# Patient Record
Sex: MALE | Race: WHITE | NOT HISPANIC OR LATINO | Employment: FULL TIME
[De-identification: names, ages, dates, MRNs, and addresses within clinical notes are randomized per-mention and may not be internally consistent; named-entity substitution may affect disease eponyms.]

---

## 2021-04-20 ENCOUNTER — TRANSCRIBE ORDERS (OUTPATIENT)
Dept: OTHER | Age: 66
End: 2021-04-20

## 2021-04-20 DIAGNOSIS — R06.09 DYSPNEA ON EXERTION: Primary | ICD-10-CM

## 2021-04-23 ENCOUNTER — DOCUMENTATION ONLY (OUTPATIENT)
Dept: ONCOLOGY | Facility: CLINIC | Age: 66
End: 2021-04-23

## 2021-04-23 ENCOUNTER — TELEPHONE (OUTPATIENT)
Dept: ONCOLOGY | Facility: CLINIC | Age: 66
End: 2021-04-23

## 2021-04-23 NOTE — PROGRESS NOTES
RECORDS STATUS - ALL OTHER DIAGNOSIS      RECORDS RECEIVED FROM: Sylvester Mayo   DATE RECEIVED:    NOTES STATUS DETAILS   OFFICE NOTE from referring provider     OFFICE NOTE from medical oncologist     DISCHARGE SUMMARY from hospital     DISCHARGE REPORT from the ER     OPERATIVE REPORT     MEDICATION LIST     CLINICAL TRIAL TREATMENTS TO DATE     LABS     PATHOLOGY REPORTS     ANYTHING RELATED TO DIAGNOSIS     GENONOMIC TESTING     TYPE:     IMAGING (NEED IMAGES & REPORT)     XR Requested 4/23 [facility]:  10/11/20, 10/10/20    [facility]:  8/23/19   CT SCANS Requested 4/23 [facility]:  10/10/20    [facility]:  12/23/19, 10/17/19, 8/20/19, 8/19/19   MRI     MAMMO     ULTRASOUND     PET

## 2021-04-27 NOTE — PROGRESS NOTES
"THORACIC SURGERY - NEW PATIENT OFFICE VISIT      I saw Mr. Doe at Dr. Xiao  request in consultation for the evaluation and treatment of a elevated R ry diaphragm.     HPI  Mr. Bret Doe is a 66 yo male with R hemidiaphragm paralysis. He has had SOB for and progressively worsening JARAMILLO (able to walk approximately 50 yards before needing to stop) and rest. His symptoms began following his R nephrectomy for RCC (stage IV d/t LN invasion, patient reports disease as \"stable\"). He has recently been prescribed supplemental home oxygen following a walk test during which his saturations dropped to low-mid 80s with <100 yard walking distance. He feels like his life is severely limited and is motivated to have this taken care of. He has been followed by Dr. Aldana's for his declining respiratory function. He is currently completing a 1-year+ course of daily prednisone, currently on taper at 5 mg/day for his remaining left kidney. He is followed at [facility] for renal function monitoring.    Previsit Tests   CXR:      Fluoro:  + paradoxical movement      CT: coronal       PMH  Degenerative lumbar disc disease L5-S1  RCC (clear cell carcinoma) s/p R nephrectomy 09/2019 and chemo (CT: scattered granulomatous disease in lungs but no malignancy in lungs, MRI brain without metastasis)  HLD  HTN    ETOH: occasional, describes inability to consume alcohol d/t nephrectomy but admits to the occasional bloody isabel  TOB: some, as juvenile    Physical examination  BMI 29.37    From a personal perspective, Mr. 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 He has a son that lives near him and works as a paramedic.     IMPRESSION No diagnosis found.   Mr. Doe is a 66 yo M with a paralyzed right hemidiaphragm   moderate-severe right-hemidiaphragm eventration/paralysis and may indeed benefit from plication surgery.     PLAN  I spent a total " of 30 minutes with Mr. Doe and his sister, more than 50% of which were spent in counseling, coordination of care, and face-to-face time. I reviewed the plan as follows:    We discussed that a plication surgery could be considered based on his symptoms.    1) Procedure planned:Robotic laparoscopic/thoracoscopic diaphragm plication.  I reviewed the indications, risks, and benefits of the procedure with Mr. Doe. We discussed the intraoperative risks of bleeding and injury to vital organs, potential postoperative complications including, but not limited to, major respiratory events, arrhythmia, bleeding, infection, reoperation, and death. I explained the anticipated hospital course (2-5 days) and postoperative recovery including pain control, chest drain management, and anticipated improvement in dyspnea. We discussed the importance of lifetime awareness to limit lifting heavy weights.  We discussed that given his abdominal surgery, we might prefer a thoracoscopic approach for him.        Necessary Preop Testing:   Sitting/supine PFTs  PAC visit for preoperative care assessment including VTE risk, cardiovascular risk.     Regional Anesthesia Plan:local at port sites  Anticoagulation Plan:s/c heparin post op  They had all their questions answered and were in agreement with the plan.  I appreciate the opportunity to participate in the care of your patient and will keep you updated.  Sincerely,

## 2021-04-29 ENCOUNTER — OFFICE VISIT (OUTPATIENT)
Dept: SURGERY | Facility: CLINIC | Age: 66
End: 2021-04-29
Attending: STUDENT IN AN ORGANIZED HEALTH CARE EDUCATION/TRAINING PROGRAM
Payer: COMMERCIAL

## 2021-04-29 VITALS
BODY MASS INDEX: 29.03 KG/M2 | HEART RATE: 67 BPM | WEIGHT: 196 LBS | TEMPERATURE: 98 F | OXYGEN SATURATION: 97 % | DIASTOLIC BLOOD PRESSURE: 71 MMHG | SYSTOLIC BLOOD PRESSURE: 106 MMHG | HEIGHT: 69 IN | RESPIRATION RATE: 16 BRPM

## 2021-04-29 DIAGNOSIS — J98.6 PARALYZED HEMIDIAPHRAGM: Primary | ICD-10-CM

## 2021-04-29 DIAGNOSIS — R06.09 DYSPNEA ON EXERTION: ICD-10-CM

## 2021-04-29 PROCEDURE — G0463 HOSPITAL OUTPT CLINIC VISIT: HCPCS

## 2021-04-29 PROCEDURE — 99204 OFFICE O/P NEW MOD 45 MIN: CPT | Performed by: STUDENT IN AN ORGANIZED HEALTH CARE EDUCATION/TRAINING PROGRAM

## 2021-04-29 RX ORDER — ERGOCALCIFEROL 1.25 MG/1
50000 CAPSULE ORAL
COMMUNITY
Start: 2020-07-28

## 2021-04-29 RX ORDER — LISINOPRIL 2.5 MG/1
1.75 TABLET ORAL EVERY EVENING
Status: ON HOLD | COMMUNITY
Start: 2020-07-28 | End: 2021-06-11

## 2021-04-29 RX ORDER — CALCITRIOL 0.25 UG/1
0.25 CAPSULE, LIQUID FILLED ORAL
Status: ON HOLD | COMMUNITY
Start: 2020-09-25 | End: 2021-06-11

## 2021-04-29 RX ORDER — ATORVASTATIN CALCIUM 40 MG/1
40 TABLET, FILM COATED ORAL EVERY EVENING
COMMUNITY
Start: 2021-02-25 | End: 2022-02-25

## 2021-04-29 RX ORDER — PREDNISONE 10 MG/1
15 TABLET ORAL
Status: ON HOLD | COMMUNITY
Start: 2021-02-09 | End: 2021-06-11

## 2021-04-29 RX ORDER — SODIUM ZIRCONIUM CYCLOSILICATE 10 G/10G
POWDER, FOR SUSPENSION ORAL
Status: ON HOLD | COMMUNITY
Start: 2021-04-12 | End: 2021-06-11

## 2021-04-29 SDOH — HEALTH STABILITY: MENTAL HEALTH: HOW OFTEN DO YOU HAVE A DRINK CONTAINING ALCOHOL?: NOT ASKED

## 2021-04-29 SDOH — HEALTH STABILITY: MENTAL HEALTH: HOW MANY STANDARD DRINKS CONTAINING ALCOHOL DO YOU HAVE ON A TYPICAL DAY?: NOT ASKED

## 2021-04-29 SDOH — HEALTH STABILITY: MENTAL HEALTH: HOW OFTEN DO YOU HAVE 6 OR MORE DRINKS ON ONE OCCASION?: NOT ASKED

## 2021-04-29 ASSESSMENT — MIFFLIN-ST. JEOR: SCORE: 1666.49

## 2021-04-29 ASSESSMENT — PAIN SCALES - GENERAL: PAINLEVEL: NO PAIN (0)

## 2021-04-29 NOTE — LETTER
"    4/29/2021         RE: Bret Doe  [address]  [facility] [address]        Dear Colleague,    Thank you for referring your patient, Bret Doe, to the [facility]. Please see a copy of my visit note below.    THORACIC SURGERY - NEW PATIENT OFFICE VISIT      I saw Mr. Doe at Dr. Xiao  request in consultation for the evaluation and treatment of a elevated R ry diaphragm.     HPI  Mr. Bret Doe is a 66 yo male with R hemidiaphragm paralysis. He has had SOB for and progressively worsening JARAMILLO (able to walk approximately 50 yards before needing to stop) and rest. His symptoms began following his R nephrectomy for RCC (stage IV d/t LN invasion, patient reports disease as \"stable\"). He has recently been prescribed supplemental home oxygen following a walk test during which his saturations dropped to low-mid 80s with <100 yard walking distance. He feels like his life is severely limited and is motivated to have this taken care of. He has been followed by Dr. Aldana's for his declining respiratory function. He is currently completing a 1-year+ course of daily prednisone, currently on taper at 5 mg/day for his remaining left kidney. He is followed at [facility] for renal function monitoring.    Previsit Tests   CXR:      Fluoro:  + paradoxical movement      CT: coronal       PMH  Degenerative lumbar disc disease L5-S1  RCC (clear cell carcinoma) s/p R nephrectomy 09/2019 and chemo (CT: scattered granulomatous disease in lungs but no malignancy in lungs, MRI brain without metastasis)  HLD  HTN    ETOH: occasional, describes inability to consume alcohol d/t nephrectomy but admits to the occasional bloody isabel  TOB: some, as juvenile    Physical examination  BMI 29.37    From a personal perspective, Mr. 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 He has a son that lives near " him and works as a paramedic.     IMPRESSION No diagnosis found.   Mr. Doe is a 66 yo M with a paralyzed right hemidiaphragm   moderate-severe right-hemidiaphragm eventration/paralysis and may indeed benefit from plication surgery.     PLAN  I spent a total of 30 minutes with Mr. Doe and his sister, more than 50% of which were spent in counseling, coordination of care, and face-to-face time. I reviewed the plan as follows:    We discussed that a plication surgery could be considered based on his symptoms.    1) Procedure planned:Robotic laparoscopic/thoracoscopic diaphragm plication.  I reviewed the indications, risks, and benefits of the procedure with Mr. Doe. We discussed the intraoperative risks of bleeding and injury to vital organs, potential postoperative complications including, but not limited to, major respiratory events, arrhythmia, bleeding, infection, reoperation, and death. I explained the anticipated hospital course (2-5 days) and postoperative recovery including pain control, chest drain management, and anticipated improvement in dyspnea. We discussed the importance of lifetime awareness to limit lifting heavy weights.  We discussed that given his abdominal surgery, we might prefer a thoracoscopic approach for him.        Necessary Preop Testing:   Sitting/supine PFTs  PAC visit for preoperative care assessment including VTE risk, cardiovascular risk.     Regional Anesthesia Plan:local at port sites  Anticoagulation Plan:s/c heparin post op  They had all their questions answered and were in agreement with the plan.  I appreciate the opportunity to participate in the care of your patient and will keep you updated.  Sincerely,        Again, thank you for allowing me to participate in the care of your patient.        Sincerely,        Chyna Deleon MD

## 2021-04-29 NOTE — NURSING NOTE
"Oncology Rooming Note    April 29, 2021 11:00 AM   Bret Doe is a 65 year old male who presents for:    Chief Complaint   Patient presents with     Oncology Clinic Visit     New pt- Dyspnea on exertion     Initial Vitals: /71 (BP Location: Right arm, Patient Position: Sitting, Cuff Size: Adult Regular)   Pulse 67   Temp 98  F (36.7  C) (Tympanic)   Resp 16   Ht 1.74 m (5' 8.5\")   Wt 88.9 kg (196 lb)   SpO2 97%   BMI 29.37 kg/m   Estimated body mass index is 29.37 kg/m  as calculated from the following:    Height as of this encounter: 1.74 m (5' 8.5\").    Weight as of this encounter: 88.9 kg (196 lb). Body surface area is 2.07 meters squared.  No Pain (0) Comment: Data Unavailable   No LMP for male patient.  Allergies reviewed: Yes  Medications reviewed: Yes    Medications: Medication refills not needed today.  Pharmacy name entered into EPIC: Data Unavailable    Clinical concerns: N/A        Paz Haro CMA              "

## 2021-04-30 ENCOUNTER — PREP FOR PROCEDURE (OUTPATIENT)
Dept: SURGERY | Facility: CLINIC | Age: 66
End: 2021-04-30

## 2021-04-30 DIAGNOSIS — J98.6 HEMIDIAPHRAGM PARALYSIS: Primary | ICD-10-CM

## 2021-04-30 RX ORDER — CEFAZOLIN SODIUM 2 G/50ML
2 SOLUTION INTRAVENOUS SEE ADMIN INSTRUCTIONS
Status: CANCELLED | OUTPATIENT
Start: 2021-04-30

## 2021-04-30 RX ORDER — CEFAZOLIN SODIUM 2 G/50ML
2 SOLUTION INTRAVENOUS
Status: CANCELLED | OUTPATIENT
Start: 2021-04-30

## 2021-05-05 ENCOUNTER — PREP FOR PROCEDURE (OUTPATIENT)
Dept: SURGERY | Facility: CLINIC | Age: 66
End: 2021-05-05

## 2021-05-05 DIAGNOSIS — J98.6 PARALYSIS OF DIAPHRAGM: Primary | ICD-10-CM

## 2021-05-05 RX ORDER — CEFAZOLIN SODIUM 2 G/50ML
2 SOLUTION INTRAVENOUS SEE ADMIN INSTRUCTIONS
Status: CANCELLED | OUTPATIENT
Start: 2021-05-05

## 2021-05-05 RX ORDER — CEFAZOLIN SODIUM 2 G/50ML
2 SOLUTION INTRAVENOUS
Status: CANCELLED | OUTPATIENT
Start: 2021-05-05

## 2021-05-07 PROBLEM — J98.6 PARALYSIS OF DIAPHRAGM: Status: ACTIVE | Noted: 2021-05-07

## 2021-05-10 ENCOUNTER — TELEPHONE (OUTPATIENT)
Dept: SURGERY | Facility: CLINIC | Age: 66
End: 2021-05-10

## 2021-05-10 NOTE — TELEPHONE ENCOUNTER
FUTURE VISIT INFORMATION      SURGERY INFORMATION:    Date: 21    Location: [facility] OR    Surgeon:  Chyna Deleon MD PLICATION, DIAPHRAGM, ROBOT-ASSISTED - thoracoscopic/ laparoscopic    Anesthesia Type:  general    Procedure: PLICATION, DIAPHRAGM, ROBOT-ASSISTED - thoracoscopic/ laparoscopic    Consult: ov     RECORDS REQUESTED FROM:       Primary Care Provider: Heidi Reeves- [facility]    Most recent EKG+ Tracin19- [facility]    Most recent ECHO: 10/12/20- 10/12/20- [facility]    Most recent PFT's: 21

## 2021-05-10 NOTE — TELEPHONE ENCOUNTER
Spoke with patient to schedule procedure with Dr. Chyna Deleon    Procedure was scheduled on 06/11 at [facility] OR  Patient will have H&P with PAC 06/02    Patient is aware a COVID-19 test is needed before their procedure. The test should be with-in 4 days of their procedure.   Test Details: Date 06/07  Location [facility]    Patient is aware a / is needed day of surgery.   Surgery letter was sent via Mail, patient has my direct contact information for any further questions.

## 2021-05-19 DIAGNOSIS — Z11.59 ENCOUNTER FOR SCREENING FOR OTHER VIRAL DISEASES: ICD-10-CM

## 2021-06-02 ENCOUNTER — PRE VISIT (OUTPATIENT)
Dept: SURGERY | Facility: CLINIC | Age: 66
End: 2021-06-02

## 2021-06-02 ENCOUNTER — ANESTHESIA EVENT (OUTPATIENT)
Dept: SURGERY | Facility: CLINIC | Age: 66
DRG: 164 | End: 2021-06-02
Payer: COMMERCIAL

## 2021-06-02 ENCOUNTER — OFFICE VISIT (OUTPATIENT)
Dept: SURGERY | Facility: CLINIC | Age: 66
End: 2021-06-02
Payer: COMMERCIAL

## 2021-06-02 VITALS
WEIGHT: 178.4 LBS | TEMPERATURE: 97.8 F | OXYGEN SATURATION: 99 % | RESPIRATION RATE: 16 BRPM | HEIGHT: 68 IN | HEART RATE: 78 BPM | DIASTOLIC BLOOD PRESSURE: 59 MMHG | BODY MASS INDEX: 27.04 KG/M2 | SYSTOLIC BLOOD PRESSURE: 85 MMHG

## 2021-06-02 DIAGNOSIS — J98.6 PARALYSIS OF DIAPHRAGM: ICD-10-CM

## 2021-06-02 DIAGNOSIS — Z01.818 PREOP EXAMINATION: Primary | ICD-10-CM

## 2021-06-02 DIAGNOSIS — J98.6 PARALYZED HEMIDIAPHRAGM: ICD-10-CM

## 2021-06-02 PROCEDURE — 86901 BLOOD TYPING SEROLOGIC RH(D): CPT | Performed by: PATHOLOGY

## 2021-06-02 PROCEDURE — 36415 COLL VENOUS BLD VENIPUNCTURE: CPT | Performed by: PATHOLOGY

## 2021-06-02 PROCEDURE — 86850 RBC ANTIBODY SCREEN: CPT | Performed by: PATHOLOGY

## 2021-06-02 PROCEDURE — 99204 OFFICE O/P NEW MOD 45 MIN: CPT | Performed by: CLINICAL NURSE SPECIALIST

## 2021-06-02 PROCEDURE — 94060 EVALUATION OF WHEEZING: CPT | Performed by: INTERNAL MEDICINE

## 2021-06-02 PROCEDURE — 86900 BLOOD TYPING SEROLOGIC ABO: CPT | Performed by: PATHOLOGY

## 2021-06-02 ASSESSMENT — LIFESTYLE VARIABLES: TOBACCO_USE: 1

## 2021-06-02 ASSESSMENT — MIFFLIN-ST. JEOR: SCORE: 1578.72

## 2021-06-02 ASSESSMENT — COPD QUESTIONNAIRES: COPD: 1

## 2021-06-02 ASSESSMENT — PAIN SCALES - GENERAL: PAINLEVEL: NO PAIN (0)

## 2021-06-02 NOTE — PATIENT INSTRUCTIONS
Preparing for Your Surgery      Name:  Bret Doe   MRN:  [id number]   :  1955   Today's Date:  2021       Arriving for surgery:  Surgery date:  21  Arrival time:  5:30AM    Restrictions due to COVID 19:  One consistent visitor per patient is allowed.  The visitor will be allowed in the pre-op area.  Visitors are asked to leave the building during the surgery.  No ill visitors.  All visitors must wear face mask.    [employer] parking is available for anyone with mobility limitations or disabilities.  ([employer]  24 hours/ 7 days a week; [employer]  7 am- 3:30 pm, Mon- Fri)    Please come to:     [facility] [address]  [address] [place] [address]    When entering the hospital you will be asked COVID screening questions, you will then be directed to Security and registration.  Registration will direct you to the correct lobby to wait until escorted to the preop area. Preop number- [phone]     What can I eat or drink?  -  You may eat and drink normally for up to 8 hours before your surgery. (Until 6/10/21, 11:30PM)  -  You may have clear liquids until 2 hours before surgery. (Until 21, 5:30AM)    Examples of clear liquids:  Water  Clear broth  Juices (apple, white grape, white cranberry  and cider) without pulp  Noncarbonated, powder based beverages  (lemonade and Yaya-Aid)  Sodas (Sprite, 7-Up, ginger ale and seltzer)  Coffee or tea (without milk or cream)  Gatorade    -  No Alcohol for at least 24 hours before surgery     Which medicines can I take?    Hold Aspirin for 7 days before surgery.   Hold Multivitamins for 7 days before surgery.  Hold Supplements for 7 days before surgery.  Hold Ibuprofen (Advil, Motrin) for 1 day before surgery--unless otherwise directed by surgeon.  Hold Naproxen (Aleve) for 4 days before surgery.    -  DO NOT take these medications the day of surgery:    Continue to hold  Lisinopril   Calcitriol(Rocaltrol)    Vitamin D      How do I prepare myself?  - Please take 2 showers before surgery using Scrubcare or Hibiclens soap.    Use this soap only from the neck to your toes.     Leave the soap on your skin for one minute--then rinse thoroughly.      You may use your own shampoo and conditioner; no other hair products.   - Please remove all jewelry and body piercings.  - No lotions, deodorants or fragrance.  - Bring your ID and insurance card.    - All patients are required to have a Covid-19 test within 4 days of surgery/procedure.      -Patients will be contacted by the [facility] scheduling team within 1 week of surgery to make an appointment.      - Patients may call the Scheduling team at [phone] if they have not been scheduled within 4 days of  surgery.        Questions or Concerns:    - For any questions regarding the day of surgery or your hospital stay, please contact the Pre Admission Nursing Office at [phone].       - If you have health changes between today and your surgery please call your surgeon.       For questions after surgery please call your surgeons office.

## 2021-06-02 NOTE — ANESTHESIA PREPROCEDURE EVALUATION
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 Type: General and MAC.    No history of anesthetic complications       ROS/MED HX  ENT/Pulmonary: Comment: Right hemidiaphragm paralysis  No history of sleep study  Intermittent smoking as youth and young adult. Up to 1 PPD at one time.  Has 02 at 2 liters but doesn't think it helps him    (+) LG risk factors, snores loudly, daytime somnolence, tobacco use, Past use, COPD,     Neurologic:  - neg neurologic ROS     Cardiovascular:     (+) Dyslipidemia hypertension-range: today 80-90s/50-60s/ ----Previous cardiac testing   Echo: Date: 10/12/20 Results:    Stress Test: Date: Results:    ECG Reviewed: Date: 20 Results:  NSR, with SA, possible LAE  Cath: Date: Results:   (-) taking anticoagulants/antiplatelets   METS/Exercise Tolerance: 3 - Able to walk 1-2 blocks without stopping    Hematologic:  - neg hematologic  ROS     Musculoskeletal:  - neg musculoskeletal ROS     GI/Hepatic:  - neg GI/hepatic ROS      Renal/Genitourinary: Comment: Solitary kidney    (+) renal disease, type: CRI, Pt does not require dialysis,     Endo: Comment: Has been off Prednisone for ~3 weeks      Psychiatric/Substance Use:  - neg psychiatric ROS     Infectious Disease:  - neg infectious disease ROS     Malignancy:   (+) Malignancy, History of Other.Other CA RCC, Stage IV disease, stable Active status post Surgery and Chemo.    Other:  - neg other ROS          Physical Exam    Airway        Mallampati: I   TM distance: > 3 FB   Neck ROM: full   Mouth opening: > 3 cm    Respiratory Devices and Support         Dental  no notable dental history         Cardiovascular          Rhythm and rate: regular and normal     Pulmonary           breath sounds clear to auscultation           OUTSIDE LABS:  CBC: No results found for: WBC, HGB, HCT, PLT  BMP: No results found for: NA, POTASSIUM, CHLORIDE, CO2, BUN, CR, GLC  COAGS: No results found for: PTT, INR, FIBR  POC: No results found for: BGM, HCG, HCGS  HEPATIC: No results found for: ALBUMIN, PROTTOTAL, ALT, AST, GGT, ALKPHOS, BILITOTAL, BILIDIRECT, BOB  OTHER: No results found for: PH, LACT, A1C, TRUNG, PHOS, MAG, LIPASE, AMYLASE, TSH, T4, T3, CRP, SED    Anesthesia Plan    ASA Status:  3      Anesthesia Type: General.     - Airway: ETT   Induction: Intravenous, Propofol.   Maintenance: Balanced.   Techniques and Equipment:     - Lines/Monitors: 2nd IV     Consents    Anesthesia Plan(s) and associated risks, benefits, and realistic alternatives discussed. Questions answered and patient/representative(s) expressed understanding.     - Discussed with:  Patient      - Extended Intubation/Ventilatory Support Discussed: No.      - Patient is DNR/DNI Status: No    Use of blood products discussed: Yes.     - Discussed with: Patient.     - Consented: consented to blood products            Reason for refusal: other.     Postoperative Care    Pain management: IV analgesics, Multi-modal analgesia.   PONV  prophylaxis: Ondansetron (or other 5HT-3), Dexamethasone or Solumedrol     Comments:    Discussed risks and benefits of general ETT anesthesia with patient.  Questions answered, patient states understanding and wishes to proceed.            PAC Discussion and Assessment    ASA Classification: 3  Case is suitable for: [place]  Anesthetic techniques and relevant risks discussed: GA  Invasive monitoring and risk discussed: No    Possibility and Risk of blood transfusion discussed: Yes            PAC Resident/NP Anesthesia Assessment:  Bret Doe is a 65 year old male scheduled to undergo PLICATION, DIAPHRAGM, ROBOT-ASSISTED - thoracoscopic/ laparoscopic with Dr. Deleon on 6/11/21. He has the following specific operative considerations:   - RCRI : 6.6% risk of major adverse cardiac event.   - VTE risk: 3%  - LG # of risks 4/8 = Intermediate risk   - Risk of PONV score = 2.  If > 2, anti-emetic intervention recommended.    --Paralysis of right hemidiaphragm with progressive JARAMILLO. Above procedure now planned.   --No history of problems with anesthesia.  --HLD. Atorvastatin at HS. BP range has been 80-90s/50/60. Reports lightheadedness for some time which he is attributing to diaphragm paralysis. Asked him to stop taking his lisinopril and to contact his Nephrologist to inform/discuss. No other cardiac history, symptoms or meds. Prior to RCC he was very active.   --Former remote smoker but did smoke up to 1 PPD at one point. LG RF but has never been tested.   --CKD Last Cr 2.67. Followed by Nephrology.  --Denies history of blood transfusion. Type and screen drawn today by team.        Patient was discussed with Dr Morales.     Reviewed and Signed by PAC Mid-Level Provider/Resident  Mid-Level Provider/Resident: NAVI Stuart, CNS  Date: 6/2/21  Time: 8:43am                               NAVI Felipe CNS

## 2021-06-02 NOTE — H&P (VIEW-ONLY)
"  Pre-Operative H & P     CC:  Preoperative exam to assess for increased cardiopulmonary risk while undergoing surgery and anesthesia.    Date of Encounter: 6/2/2021  Primary Care Physician:  eHidi Reeves  Reason for visit: Paralysis of diaphragm [J98.6]  HPI  Bret Doe is a 65 year old male who presents for pre-operative H & P in preparation for PLICATION, DIAPHRAGM, ROBOT-ASSISTED - thoracoscopic/ laparoscopic with Dr. Deleon on 6/11/21 at [facility]. History is obtained from the patient and medical records.    Patient who was recently referred to Dr. Deleon with findings of right hemidiaphragm paralysis after presenting with progressively worsening JARAMILLO. He is able to walk approximately 50 yards before needing to stop and rest. He has more recently been prescribed supplemental home oxygen following a walk test during which his saturations dropped to low-mid 80s with <100 yard walking distance. He had been on prednisone for some time, now off for approximately 3 weeks. The patient's symptoms began after he underwent a right nephrectomy for renal cell carcinoma. He feels that his symptoms are severely limiting his quality of life. His imaging and work up was reviewed and he was counseled for above procedure.     He is followed through [facility] for his RCC, diagnosed with stage IV disease metastatic to the right adrenal and lymph nodes, s/p palliative nephrectomy and 1 cycle of ipilumunab/nivolumab discontinued due to hepatorenal toxciity. Continues surveillance, and 3 month imaging was stable. His history is otherwise significant for HLD, HTN, LG RF, and CKD. His is followed at [facility] for his renal disease and last saw his Nephrologist on 5/27/21. Per notes:      \"ASSESSMENT / PLAN     #1 Chronic Kidney Disease Stage 4 Glomerular Filtration Rate 15-29 (HCC)  #2 RCC s/p right nephrectomy (now off ICI)  At this point kidney function has remained stable off of " "prednisone (if anything improved). Will continue to monitor. He will monitor BP and inform us if elevated. In the meantime I do not see her role for Lokelma. He will stop and will check BMP next week to ensure potassium has not increased. I also do not see her role of calcitriol and calcium is upper limit of normal and have advised him to stop.    I will see him back in person in 3 months and will check calcium, phos, PTH and vitamin D at that point. \"    Today patient denies fever, chest pain, irregular HR, or ankle edema. He has never had a sleep study. He does have episodic coughing and DYSPNEA ON EXERTION which is becoming more limiting. He is on lisinopril, primarily for kidney protection and presented today with BP of 85/59. When asked about symptoms he reported that he has has been feeling lightheaded for some time and can't get through a shower without laying on the floor at least three times.       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      Hx of abnormal bleeding or anti-platelet use: Denies.     Menstrual history: No LMP for male patient.    Steroid use in the last year: Yes.     Personal or FH with difficulty with Anesthesia:  Denies.    Prior to Admission Medications  Current Outpatient Medications   Medication Sig Dispense Refill     atorvastatin (LIPITOR) 40 MG tablet Take 40 mg by mouth every evening        lisinopril (ZESTRIL) 2.5 MG tablet Take 1.75 mg by mouth every evening        Vitamin D 12.5 MCG/0.25ML LIQD " Take by mouth once every six weeks       calcitRIOL (ROCALTROL) 0.25 MCG capsule Take 0.25 mcg by mouth        ergocalciferol (ERGOCALCIFEROL) 1.25 MG (92921 UT) capsule Take 50,000 Units by mouth        LOKELMA 10 g PACK packet        predniSONE (DELTASONE) 10 MG tablet Take 15 mg by mouth          Allergies  No Known Allergies    Social History  Social History     Socioeconomic History     Marital status: Single     Spouse name: Not on file     Number of children: Not on file     Years of education: Not on file     Highest education level: Not on file   Occupational History     Not on file   Social Needs     Financial resource strain: Not on file     Food insecurity     Worry: Not on file     Inability: Not on file     Transportation needs     Medical: Not on file     Non-medical: Not on file   Tobacco Use     Smoking status: Former Smoker     Types: Cigarettes     Smokeless tobacco: Never Used     Tobacco comment: Smoked off and on in youth and young adulthood. Up to 1PPD at one point.   Substance and Sexual Activity     Alcohol use: Not Currently     Drug use: Not Currently     Sexual activity: Not on file   Lifestyle     Physical activity     Days per week: Not on file     Minutes per session: Not on file     Stress: Not on file   Relationships     Social connections     Talks on phone: Not on file     Gets together: Not on file     Attends Baptism service: Not on file     Active member of club or organization: Not on file     Attends meetings of clubs or organizations: Not on file     Relationship status: Not on file     Intimate partner violence     Fear of current or ex partner: Not on file     Emotionally abused: Not on file     Physically abused: Not on file     Forced sexual activity: Not on file   Other Topics Concern     Not on file   Social History Narrative     Not on file       Family History  Family History   Problem Relation Age of Onset     Breast Cancer Mother      Multiple myeloma Father   "    ROS/MED HISTORY  The complete review of systems is negative other than noted in the HPI or here.    ENT/Pulmonary: Comment: Right hemidiaphragm paralysis    tobacco use, COPD,     Neurologic:  - neg neurologic ROS     Cardiovascular:     (+) Dyslipidemia hypertension-----Previous cardiac testing   Echo: Date: 10/12/20 Results:    Stress Test: Date: Results:    ECG Reviewed: Date: 9/2/20 Results:  NSR, with SA, possible LAE  Cath: Date: Results:   (-) taking anticoagulants/antiplatelets   METS/Exercise Tolerance: 3 - Able to walk 1-2 blocks without stopping    Hematologic:  - neg hematologic  ROS     Musculoskeletal:  - neg musculoskeletal ROS     GI/Hepatic:  - neg GI/hepatic ROS     Renal/Genitourinary: Comment: Solitary kidney    (+) renal disease, type: CRI, Pt does not require dialysis,     Endo:     (+) Chronic steroid usage for     Psychiatric/Substance Use:  - neg psychiatric ROS     Infectious Disease:  - neg infectious disease ROS     Malignancy:   (+) Malignancy, History of Other.Other CA RCC, Stage IV disease, stable Active status post Surgery and Chemo.    Other:  - neg other ROS        Temp: 97.8  F (36.6  C)   BP: (!) 85/59 Pulse: 78   Resp: 16 SpO2: 99 %         178 lbs 6.4 oz  5' 8\"   Body mass index is 27.13 kg/m . Recheck 97/68       Physical Exam  Constitutional: Awake, alert, cooperative, no apparent distress, and appears stated age. In w/c. Accompanied by sister.   Eyes: Pupils equal, round and reactive to light, extra ocular muscles intact, sclera clear, conjunctiva normal. Glasses on.  HENT: Normocephalic, oral pharynx with moist mucus membranes, good dentition. No goiter appreciated.   Respiratory: Clear to auscultation bilaterally, no crackles or wheezing. No cough today. Some DYSPNEA ON EXERTION noticeable when moving or talking.   Cardiovascular: Regular rate and rhythm, normal S1 and S2, and no murmur noted.  Carotids +2, no bruits. No edema. Palpable pulses to radial  DP and PT " arteries.   GI: Normal bowel sounds, soft, non-distended, non-tender, no masses palpated.  Surgical scars: well healed RUQ.  Lymph/Hematologic: No cervical lymphadenopathy and no supraclavicular lymphadenopathy.  Genitourinary:  Deferred.   Skin: Warm and dry.  No rashes at anticipated surgical site.   Musculoskeletal: Full ROM of neck. There is no redness, warmth, or swelling of the joints. Gross motor strength is mildly weakened. History of right clavicle fracture with noticeable irregularity.  Neurologic: Awake, alert, oriented to name, place and time. Cranial nerves II-XII are grossly intact. Gait is normal.   Neuropsychiatric: Calm, cooperative. Normal affect.     Labs: (personally reviewed) OSH 6/1/21  Protein, urine 193.9  Na 140  K 4.0  Cl 99  Cr 2.67  GFR 24  Glu 98  Phos 2.8  Parathyroid hormone, intact 26.4  WBC 9.7  Hgb 11.0  hematocrit 32.6  Platelets 332  4/5/21 BNP 59.6    EKG: Personally reviewed 9/2/20 Normal sinus rhythm with sinus arrhythmia, possible left atrial enlargement.  Cardiac echo: 10/12/20   Final Conclusion   Visually Estimated EF: 55-60%   Technically difficult study - contrast was used to enhance endocardial definition due to   suboptimal image quality.   Normal LV size and systolic function.   Upper limit of normal right ventricular size with borderline right ventricular systolic   function on technically difficult images.   Cannot accurately assess pulmonary artery pressure based on limited tricuspid regurgitation.   IVC not well visualized.   No significant valvular heart disease noted.  PFT's: OSH PFTs 1/18/21  SPIROMETRY:  The FVC is 1.98 liters which is 46% predicted and below the lower limit of normal.  The FEV1 is 1.68 liters which is 51% predicted and below the lower limit of normal.  The FEV1/FVC percent is 84.86 and above the lower limit of normal.  The FEF 25-75 percent is 2.04 liters which is 76% predicted and above the lower limit of normal.  The MVV is 72 liters per  minute which is 55% predicted and below the lower limit of normal.  After the administration of a bronchodilator, the FEV1 improves by +8% and the FVC by +4%.    LUNG VOLUMES:  The SVC is 2.08 liters which is 49% predicted and above the lower limit of normal.  The RV is 1.55 liters which is 70% predicted and above the lower limit of normal.  The TLC is 3.63 liters which is 54% predicted and below the lower limit of normal.  The RV/TLC percent is 42.78 which is 127% predicted.  The ERV is 0.06 liters which is 5% predicted.    DIFFUSION:  The pulmonary diffusion capacity uncorrected for hemoglobin is 16.20 mL/min/mmHg which is below the lower limit of normal.    IMPRESSION:  1.  Reduced FVC suggestive of restriction.  2.  No significant bronchodilator response.  3.  Reduced total lung capacity confirms restriction.  4.  Mild reduction in pulmonary diffusion capacity.  5.  No prior testing is available for comparison.    Updated PFTs today:  Most Recent [facility] Pulmonary Function Testing    FVC-Pred   Date Value Ref Range Status   06/02/2021 4.22 L      FVC-Pre   Date Value Ref Range Status   06/02/2021 1.74 L      FVC-%Pred-Pre   Date Value Ref Range Status   06/02/2021 41 %      FEV1-Pre   Date Value Ref Range Status   06/02/2021 1.49 L      FEV1-%Pred-Pre   Date Value Ref Range Status   06/02/2021 45 %      FEV1FVC-Pred   Date Value Ref Range Status   06/02/2021 77 %      FEV1FVC-Pre   Date Value Ref Range Status   06/02/2021 86 %        FEFMax-Pred   Date Value Ref Range Status   06/02/2021 8.55 L/sec      FEFMax-Pre   Date Value Ref Range Status   06/02/2021 5.65 L/sec      FEFMax-%Pred-Pre   Date Value Ref Range Status   06/02/2021 66 %      ExpTime-Pre   Date Value Ref Range Status   06/02/2021 5.41 sec      FIFMax-Pre   Date Value Ref Range Status   06/02/2021 3.29 L/sec      FEV1FEV6-Pred   Date Value Ref Range Status   06/02/2021 79 %      FEV1FEV6-Pre   Date Value Ref Range Status   06/02/2021 86 %         Xray chest fluoro  4/5/21  IMPRESSION:  Findings consistent with right hemidiaphragm paralysis.    CT CAP 3/10/21  IMPRESSION:  1. Stable tiny pulmonary nodules, recommend attention on follow-up.  2. Stable postoperative change of right nephrectomy.  No evidence of local  disease recurrence.  3. Stable retroperitoneal adenopathy.    Imaging, PFTs and cardiac testing reviewed by this provider        Outside records reviewed from: Care Everywhere    ASSESSMENT and PLAN  Bret Doe is a 65 year old male scheduled to undergo PLICATION, DIAPHRAGM, ROBOT-ASSISTED - thoracoscopic/ laparoscopic with Dr. Deleon on 6/11/21. He has the following specific operative considerations:   - RCRI : 6.6% risk of major adverse cardiac event.   - Anesthesia considerations:  Refer to PAC assessment in anesthesia records  - VTE risk: 3%  - LG # of risks 4/8 = Intermediate risk   - Risk of PONV score = 2.  If > 2, anti-emetic intervention recommended.    --Paralysis of right hemidiaphragm with progressive JARAMILLO. Above procedure now planned.   --No history of problems with anesthesia.  --HLD. Atorvastatin at HS. BP range has been 80-90s/50/60. Reports lightheadedness for some time which he is attributing to diaphragm paralysis. Asked him to stop taking his lisinopril and to contact his Nephrologist to inform/discuss. No other cardiac history, symptoms or meds. Prior to RCC he was very active.   --Former remote smoker but did smoke up to 1 PPD at one point. LG RF but has never been tested.   --CKD Last Cr 2.67. Followed by Nephrology.  --Denies history of blood transfusion. Type and screen drawn today by team.    Arrival time, NPO, shower and medication instructions provided by nursing staff today.       Patient was discussed with Dr Morales.    NAVI Felipe CNS  Preoperative Assessment Center  [facility]  Phone: [phone]  Fax: [phone]

## 2021-06-02 NOTE — H&P
"  Pre-Operative H & P     CC:  Preoperative exam to assess for increased cardiopulmonary risk while undergoing surgery and anesthesia.    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 Deleon on 6/11/21 at [facility]. History is obtained from the patient and medical records.    Patient who was recently referred to Dr. Deleon with findings of right hemidiaphragm paralysis after presenting with progressively worsening JARAMILLO. He is able to walk approximately 50 yards before needing to stop and rest. He has more recently been prescribed supplemental home oxygen following a walk test during which his saturations dropped to low-mid 80s with <100 yard walking distance. He had been on prednisone for some time, now off for approximately 3 weeks. The patient's symptoms began after he underwent a right nephrectomy for renal cell carcinoma. He feels that his symptoms are severely limiting his quality of life. His imaging and work up was reviewed and he was counseled for above procedure.     He is followed through [facility] for his RCC, diagnosed with stage IV disease metastatic to the right adrenal and lymph nodes, s/p palliative nephrectomy and 1 cycle of ipilumunab/nivolumab discontinued due to hepatorenal toxciity. Continues surveillance, and 3 month imaging was stable. His history is otherwise significant for HLD, HTN, LG RF, and CKD. His is followed at [place] for his renal disease and last saw his Nephrologist on 5/27/21. Per notes:      \"ASSESSMENT / PLAN     #1 Chronic Kidney Disease Stage 4 Glomerular Filtration Rate 15-29 (HCC)  #2 RCC s/p right nephrectomy (now off ICI)  At this point kidney function has remained stable off of " "prednisone (if anything improved). Will continue to monitor. He will monitor BP and inform us if elevated. In the meantime I do not see her role for Lokelma. He will stop and will check BMP next week to ensure potassium has not increased. I also do not see her role of calcitriol and calcium is upper limit of normal and have advised him to stop.    I will see him back in person in 3 months and will check calcium, phos, PTH and vitamin D at that point. \"    Today patient denies fever, chest pain, irregular HR, or ankle edema. He has never had a sleep study. He does have episodic coughing and DYSPNEA ON EXERTION which is becoming more limiting. He is on lisinopril, primarily for kidney protection and presented today with BP of 85/59. When asked about symptoms he reported that he has has been feeling lightheaded for some time and can't get through a shower without laying on the floor at least three times.       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      Hx of abnormal bleeding or anti-platelet use: Denies.     Menstrual history: No LMP for male patient.    Steroid use in the last year: Yes.     Personal or FH with difficulty with Anesthesia:  Denies.    Prior to Admission Medications  Current Outpatient Medications   Medication Sig Dispense Refill     atorvastatin (LIPITOR) 40 MG tablet Take 40 mg by mouth every evening        lisinopril (ZESTRIL) 2.5 MG tablet Take 1.75 mg by mouth every evening        Vitamin D 12.5 MCG/0.25ML LIQD " Take by mouth once every six weeks       calcitRIOL (ROCALTROL) 0.25 MCG capsule Take 0.25 mcg by mouth        ergocalciferol (ERGOCALCIFEROL) 1.25 MG (99490 UT) capsule Take 50,000 Units by mouth        LOKELMA 10 g PACK packet        predniSONE (DELTASONE) 10 MG tablet Take 15 mg by mouth          Allergies  No Known Allergies    Social History  Social History     Socioeconomic History     Marital status: Single     Spouse name: Not on file     Number of children: Not on file     Years of education: Not on file     Highest education level: Not on file   Occupational History     Not on file   Social Needs     Financial resource strain: Not on file     Food insecurity     Worry: Not on file     Inability: Not on file     Transportation needs     Medical: Not on file     Non-medical: Not on file   Tobacco Use     Smoking status: Former Smoker     Types: Cigarettes     Smokeless tobacco: Never Used     Tobacco comment: Smoked off and on in youth and young adulthood. Up to 1PPD at one point.   Substance and Sexual Activity     Alcohol use: Not Currently     Drug use: Not Currently     Sexual activity: Not on file   Lifestyle     Physical activity     Days per week: Not on file     Minutes per session: Not on file     Stress: Not on file   Relationships     Social connections     Talks on phone: Not on file     Gets together: Not on file     Attends Mandaen service: Not on file     Active member of club or organization: Not on file     Attends meetings of clubs or organizations: Not on file     Relationship status: Not on file     Intimate partner violence     Fear of current or ex partner: Not on file     Emotionally abused: Not on file     Physically abused: Not on file     Forced sexual activity: Not on file   Other Topics Concern     Not on file   Social History Narrative     Not on file       Family History  Family History   Problem Relation Age of Onset     Breast Cancer Mother      Multiple myeloma Father   "    ROS/MED HISTORY  The complete review of systems is negative other than noted in the HPI or here.    ENT/Pulmonary: Comment: Right hemidiaphragm paralysis    tobacco use, COPD,     Neurologic:  - neg neurologic ROS     Cardiovascular:     (+) Dyslipidemia hypertension-----Previous cardiac testing   Echo: Date: 10/12/20 Results:    Stress Test: Date: Results:    ECG Reviewed: Date: 9/2/20 Results:  NSR, with SA, possible LAE  Cath: Date: Results:   (-) taking anticoagulants/antiplatelets   METS/Exercise Tolerance: 3 - Able to walk 1-2 blocks without stopping    Hematologic:  - neg hematologic  ROS     Musculoskeletal:  - neg musculoskeletal ROS     GI/Hepatic:  - neg GI/hepatic ROS     Renal/Genitourinary: Comment: Solitary kidney    (+) renal disease, type: CRI, Pt does not require dialysis,     Endo:     (+) Chronic steroid usage for     Psychiatric/Substance Use:  - neg psychiatric ROS     Infectious Disease:  - neg infectious disease ROS     Malignancy:   (+) Malignancy, History of Other.Other CA RCC, Stage IV disease, stable Active status post Surgery and Chemo.    Other:  - neg other ROS        Temp: 97.8  F (36.6  C)   BP: (!) 85/59 Pulse: 78   Resp: 16 SpO2: 99 %         178 lbs 6.4 oz  5' 8\"   Body mass index is 27.13 kg/m . Recheck 97/68       Physical Exam  Constitutional: Awake, alert, cooperative, no apparent distress, and appears stated age. In w/c. Accompanied by sister.   Eyes: Pupils equal, round and reactive to light, extra ocular muscles intact, sclera clear, conjunctiva normal. Glasses on.  HENT: Normocephalic, oral pharynx with moist mucus membranes, good dentition. No goiter appreciated.   Respiratory: Clear to auscultation bilaterally, no crackles or wheezing. No cough today. Some DYSPNEA ON EXERTION noticeable when moving or talking.   Cardiovascular: Regular rate and rhythm, normal S1 and S2, and no murmur noted.  Carotids +2, no bruits. No edema. Palpable pulses to radial  DP and PT " arteries.   GI: Normal bowel sounds, soft, non-distended, non-tender, no masses palpated.  Surgical scars: well healed RUQ.  Lymph/Hematologic: No cervical lymphadenopathy and no supraclavicular lymphadenopathy.  Genitourinary:  Deferred.   Skin: Warm and dry.  No rashes at anticipated surgical site.   Musculoskeletal: Full ROM of neck. There is no redness, warmth, or swelling of the joints. Gross motor strength is mildly weakened. History of right clavicle fracture with noticeable irregularity.  Neurologic: Awake, alert, oriented to name, place and time. Cranial nerves II-XII are grossly intact. Gait is normal.   Neuropsychiatric: Calm, cooperative. Normal affect.     Labs: (personally reviewed) OSH 6/1/21  Protein, urine 193.9  Na 140  K 4.0  Cl 99  Cr 2.67  GFR 24  Glu 98  Phos 2.8  Parathyroid hormone, intact 26.4  WBC 9.7  Hgb 11.0  hematocrit 32.6  Platelets 332  4/5/21 BNP 59.6    EKG: Personally reviewed 9/2/20 Normal sinus rhythm with sinus arrhythmia, possible left atrial enlargement.  Cardiac echo: 10/12/20   Final Conclusion   Visually Estimated EF: 55-60%   Technically difficult study - contrast was used to enhance endocardial definition due to   suboptimal image quality.   Normal LV size and systolic function.   Upper limit of normal right ventricular size with borderline right ventricular systolic   function on technically difficult images.   Cannot accurately assess pulmonary artery pressure based on limited tricuspid regurgitation.   IVC not well visualized.   No significant valvular heart disease noted.  PFT's: OSH PFTs 1/18/21  SPIROMETRY:  The FVC is 1.98 liters which is 46% predicted and below the lower limit of normal.  The FEV1 is 1.68 liters which is 51% predicted and below the lower limit of normal.  The FEV1/FVC percent is 84.86 and above the lower limit of normal.  The FEF 25-75 percent is 2.04 liters which is 76% predicted and above the lower limit of normal.  The MVV is 72 liters per  minute which is 55% predicted and below the lower limit of normal.  After the administration of a bronchodilator, the FEV1 improves by +8% and the FVC by +4%.    LUNG VOLUMES:  The SVC is 2.08 liters which is 49% predicted and above the lower limit of normal.  The RV is 1.55 liters which is 70% predicted and above the lower limit of normal.  The TLC is 3.63 liters which is 54% predicted and below the lower limit of normal.  The RV/TLC percent is 42.78 which is 127% predicted.  The ERV is 0.06 liters which is 5% predicted.    DIFFUSION:  The pulmonary diffusion capacity uncorrected for hemoglobin is 16.20 mL/min/mmHg which is below the lower limit of normal.    IMPRESSION:  1.  Reduced FVC suggestive of restriction.  2.  No significant bronchodilator response.  3.  Reduced total lung capacity confirms restriction.  4.  Mild reduction in pulmonary diffusion capacity.  5.  No prior testing is available for comparison.    Updated PFTs today:  Most Recent [facility] Pulmonary Function Testing    FVC-Pred   Date Value Ref Range Status   06/02/2021 4.22 L      FVC-Pre   Date Value Ref Range Status   06/02/2021 1.74 L      FVC-%Pred-Pre   Date Value Ref Range Status   06/02/2021 41 %      FEV1-Pre   Date Value Ref Range Status   06/02/2021 1.49 L      FEV1-%Pred-Pre   Date Value Ref Range Status   06/02/2021 45 %      FEV1FVC-Pred   Date Value Ref Range Status   06/02/2021 77 %      FEV1FVC-Pre   Date Value Ref Range Status   06/02/2021 86 %        FEFMax-Pred   Date Value Ref Range Status   06/02/2021 8.55 L/sec      FEFMax-Pre   Date Value Ref Range Status   06/02/2021 5.65 L/sec      FEFMax-%Pred-Pre   Date Value Ref Range Status   06/02/2021 66 %      ExpTime-Pre   Date Value Ref Range Status   06/02/2021 5.41 sec      FIFMax-Pre   Date Value Ref Range Status   06/02/2021 3.29 L/sec      FEV1FEV6-Pred   Date Value Ref Range Status   06/02/2021 79 %      FEV1FEV6-Pre   Date Value Ref Range Status   06/02/2021 86 %         Xray chest fluoro  4/5/21  IMPRESSION:  Findings consistent with right hemidiaphragm paralysis.    CT CAP 3/10/21  IMPRESSION:  1. Stable tiny pulmonary nodules, recommend attention on follow-up.  2. Stable postoperative change of right nephrectomy.  No evidence of local  disease recurrence.  3. Stable retroperitoneal adenopathy.    Imaging, PFTs and cardiac testing reviewed by this provider        Outside records reviewed from: Care Everywhere    ASSESSMENT and PLAN  Bret Doe is a 65 year old male scheduled to undergo PLICATION, DIAPHRAGM, ROBOT-ASSISTED - thoracoscopic/ laparoscopic with Dr. Deleon on 6/11/21. He has the following specific operative considerations:   - RCRI : 6.6% risk of major adverse cardiac event.   - Anesthesia considerations:  Refer to PAC assessment in anesthesia records  - VTE risk: 3%  - LG # of risks 4/8 = Intermediate risk   - Risk of PONV score = 2.  If > 2, anti-emetic intervention recommended.    --Paralysis of right hemidiaphragm with progressive JARAMILLO. Above procedure now planned.   --No history of problems with anesthesia.  --HLD. Atorvastatin at HS. BP range has been 80-90s/50/60. Reports lightheadedness for some time which he is attributing to diaphragm paralysis. Asked him to stop taking his lisinopril and to contact his Nephrologist to inform/discuss. No other cardiac history, symptoms or meds. Prior to RCC he was very active.   --Former remote smoker but did smoke up to 1 PPD at one point. LG RF but has never been tested.   --CKD Last Cr 2.67. Followed by Nephrology.  --Denies history of blood transfusion. Type and screen drawn today by team.    Arrival time, NPO, shower and medication instructions provided by nursing staff today.       Patient was discussed with Dr Morales.    NAVI Felipe CNS  Preoperative Assessment Center  [facility]  Phone: [phone]  Fax: [phone]

## 2021-06-03 LAB
EXPTIME-PRE: 5.41 SEC
FEF2575-%PRED-POST: 29 %
FEF2575-%PRED-PRE: 69 %
FEF2575-POST: 0.78 L/SEC
FEF2575-PRE: 1.85 L/SEC
FEF2575-PRED: 2.68 L/SEC
FEFMAX-%PRED-PRE: 66 %
FEFMAX-PRE: 5.65 L/SEC
FEFMAX-PRED: 8.55 L/SEC
FEV1-%PRED-PRE: 45 %
FEV1-PRE: 1.49 L
FEV1FEV6-PRE: 86 %
FEV1FEV6-PRED: 79 %
FEV1FVC-PRE: 86 %
FEV1FVC-PRED: 77 %
FIFMAX-PRE: 3.29 L/SEC
FVC-%PRED-PRE: 41 %
FVC-PRE: 1.74 L
FVC-PRED: 4.22 L

## 2021-06-07 DIAGNOSIS — Z11.59 ENCOUNTER FOR SCREENING FOR OTHER VIRAL DISEASES: ICD-10-CM

## 2021-06-07 LAB
SARS-COV-2 RNA RESP QL NAA+PROBE: NORMAL
SPECIMEN SOURCE: NORMAL

## 2021-06-07 PROCEDURE — U0005 INFEC AGEN DETEC AMPLI PROBE: HCPCS | Performed by: STUDENT IN AN ORGANIZED HEALTH CARE EDUCATION/TRAINING PROGRAM

## 2021-06-07 PROCEDURE — U0003 INFECTIOUS AGENT DETECTION BY NUCLEIC ACID (DNA OR RNA); SEVERE ACUTE RESPIRATORY SYNDROME CORONAVIRUS 2 (SARS-COV-2) (CORONAVIRUS DISEASE [COVID-19]), AMPLIFIED PROBE TECHNIQUE, MAKING USE OF HIGH THROUGHPUT TECHNOLOGIES AS DESCRIBED BY CMS-2020-01-R: HCPCS | Performed by: STUDENT IN AN ORGANIZED HEALTH CARE EDUCATION/TRAINING PROGRAM

## 2021-06-08 LAB
LABORATORY COMMENT REPORT: NORMAL
SARS-COV-2 RNA RESP QL NAA+PROBE: NEGATIVE
SPECIMEN SOURCE: NORMAL

## 2021-06-11 ENCOUNTER — ANESTHESIA (OUTPATIENT)
Dept: SURGERY | Facility: CLINIC | Age: 66
DRG: 164 | End: 2021-06-11
Payer: COMMERCIAL

## 2021-06-11 ENCOUNTER — APPOINTMENT (OUTPATIENT)
Dept: GENERAL RADIOLOGY | Facility: CLINIC | Age: 66
DRG: 164 | End: 2021-06-11
Attending: STUDENT IN AN ORGANIZED HEALTH CARE EDUCATION/TRAINING PROGRAM
Payer: COMMERCIAL

## 2021-06-11 ENCOUNTER — HOSPITAL ENCOUNTER (INPATIENT)
Facility: CLINIC | Age: 66
LOS: 3 days | Discharge: HOME | DRG: 164 | End: 2021-06-14
Attending: STUDENT IN AN ORGANIZED HEALTH CARE EDUCATION/TRAINING PROGRAM | Admitting: STUDENT IN AN ORGANIZED HEALTH CARE EDUCATION/TRAINING PROGRAM
Payer: COMMERCIAL

## 2021-06-11 DIAGNOSIS — J98.6 PARALYSIS OF DIAPHRAGM: ICD-10-CM

## 2021-06-11 LAB
ABO + RH BLD: NORMAL
ABO + RH BLD: NORMAL
BLD GP AB SCN SERPL QL: NORMAL
BLOOD BANK CMNT PATIENT-IMP: NORMAL
BLOOD BANK CMNT PATIENT-IMP: NORMAL
CREAT SERPL-MCNC: 3.19 MG/DL (ref 0.66–1.25)
GFR SERPL CREATININE-BSD FRML MDRD: 20 ML/MIN/{1.73_M2}
GLUCOSE BLDC GLUCOMTR-MCNC: 95 MG/DL (ref 70–99)
HGB BLD-MCNC: 9.8 G/DL (ref 13.3–17.7)
PLATELET # BLD AUTO: 268 10E9/L (ref 150–450)
POTASSIUM SERPL-SCNC: 4.6 MMOL/L (ref 3.4–5.3)
SPECIMEN EXP DATE BLD: NORMAL

## 2021-06-11 PROCEDURE — 250N000013 HC RX MED GY IP 250 OP 250 PS 637: Performed by: STUDENT IN AN ORGANIZED HEALTH CARE EDUCATION/TRAINING PROGRAM

## 2021-06-11 PROCEDURE — 250N000024 HC ISOFLURANE, PER MIN: Performed by: STUDENT IN AN ORGANIZED HEALTH CARE EDUCATION/TRAINING PROGRAM

## 2021-06-11 PROCEDURE — 258N000003 HC RX IP 258 OP 636: Performed by: ANESTHESIOLOGY

## 2021-06-11 PROCEDURE — 250N000009 HC RX 250: Performed by: STUDENT IN AN ORGANIZED HEALTH CARE EDUCATION/TRAINING PROGRAM

## 2021-06-11 PROCEDURE — 258N000003 HC RX IP 258 OP 636: Performed by: NURSE ANESTHETIST, CERTIFIED REGISTERED

## 2021-06-11 PROCEDURE — 250N000011 HC RX IP 250 OP 636: Performed by: STUDENT IN AN ORGANIZED HEALTH CARE EDUCATION/TRAINING PROGRAM

## 2021-06-11 PROCEDURE — 999N001017 HC STATISTIC GLUCOSE BY METER IP

## 2021-06-11 PROCEDURE — 82565 ASSAY OF CREATININE: CPT | Performed by: CLINICAL NURSE SPECIALIST

## 2021-06-11 PROCEDURE — 999N000157 HC STATISTIC RCP TIME EA 10 MIN

## 2021-06-11 PROCEDURE — 272N000001 HC OR GENERAL SUPPLY STERILE: Performed by: STUDENT IN AN ORGANIZED HEALTH CARE EDUCATION/TRAINING PROGRAM

## 2021-06-11 PROCEDURE — 36415 COLL VENOUS BLD VENIPUNCTURE: CPT | Performed by: STUDENT IN AN ORGANIZED HEALTH CARE EDUCATION/TRAINING PROGRAM

## 2021-06-11 PROCEDURE — 71045 X-RAY EXAM CHEST 1 VIEW: CPT | Mod: 26 | Performed by: RADIOLOGY

## 2021-06-11 PROCEDURE — 999N000065 XR CHEST PORT 1 VIEW

## 2021-06-11 PROCEDURE — 710N000010 HC RECOVERY PHASE 1, LEVEL 2, PER MIN: Performed by: STUDENT IN AN ORGANIZED HEALTH CARE EDUCATION/TRAINING PROGRAM

## 2021-06-11 PROCEDURE — 8E0W4CZ ROBOTIC ASSISTED PROCEDURE OF TRUNK REGION, PERCUTANEOUS ENDOSCOPIC APPROACH: ICD-10-PCS | Performed by: STUDENT IN AN ORGANIZED HEALTH CARE EDUCATION/TRAINING PROGRAM

## 2021-06-11 PROCEDURE — 250N000011 HC RX IP 250 OP 636: Performed by: NURSE ANESTHETIST, CERTIFIED REGISTERED

## 2021-06-11 PROCEDURE — 0BQT4ZZ REPAIR DIAPHRAGM, PERCUTANEOUS ENDOSCOPIC APPROACH: ICD-10-PCS | Performed by: STUDENT IN AN ORGANIZED HEALTH CARE EDUCATION/TRAINING PROGRAM

## 2021-06-11 PROCEDURE — 250N000009 HC RX 250: Performed by: NURSE ANESTHETIST, CERTIFIED REGISTERED

## 2021-06-11 PROCEDURE — 36415 COLL VENOUS BLD VENIPUNCTURE: CPT | Performed by: CLINICAL NURSE SPECIALIST

## 2021-06-11 PROCEDURE — 370N000017 HC ANESTHESIA TECHNICAL FEE, PER MIN: Performed by: STUDENT IN AN ORGANIZED HEALTH CARE EDUCATION/TRAINING PROGRAM

## 2021-06-11 PROCEDURE — 84132 ASSAY OF SERUM POTASSIUM: CPT | Performed by: CLINICAL NURSE SPECIALIST

## 2021-06-11 PROCEDURE — 258N000003 HC RX IP 258 OP 636: Performed by: STUDENT IN AN ORGANIZED HEALTH CARE EDUCATION/TRAINING PROGRAM

## 2021-06-11 PROCEDURE — 85049 AUTOMATED PLATELET COUNT: CPT | Performed by: STUDENT IN AN ORGANIZED HEALTH CARE EDUCATION/TRAINING PROGRAM

## 2021-06-11 PROCEDURE — 85018 HEMOGLOBIN: CPT | Performed by: CLINICAL NURSE SPECIALIST

## 2021-06-11 PROCEDURE — 250N000011 HC RX IP 250 OP 636: Performed by: ANESTHESIOLOGY

## 2021-06-11 PROCEDURE — 120N000002 HC R&B MED SURG/OB UMMC

## 2021-06-11 PROCEDURE — 360N000080 HC SURGERY LEVEL 7, PER MIN: Performed by: STUDENT IN AN ORGANIZED HEALTH CARE EDUCATION/TRAINING PROGRAM

## 2021-06-11 PROCEDURE — 999N000015 HC STATISTIC ARTERIAL MONITORING DAILY

## 2021-06-11 PROCEDURE — 999N000141 HC STATISTIC PRE-PROCEDURE NURSING ASSESSMENT: Performed by: STUDENT IN AN ORGANIZED HEALTH CARE EDUCATION/TRAINING PROGRAM

## 2021-06-11 RX ORDER — MEPERIDINE HYDROCHLORIDE 25 MG/ML
12.5 INJECTION INTRAMUSCULAR; INTRAVENOUS; SUBCUTANEOUS EVERY 5 MIN PRN
Status: DISCONTINUED | OUTPATIENT
Start: 2021-06-11 | End: 2021-06-11 | Stop reason: HOSPADM

## 2021-06-11 RX ORDER — PROPOFOL 10 MG/ML
INJECTION, EMULSION INTRAVENOUS PRN
Status: DISCONTINUED | OUTPATIENT
Start: 2021-06-11 | End: 2021-06-11

## 2021-06-11 RX ORDER — HEPARIN SODIUM 5000 [USP'U]/.5ML
5000 INJECTION, SOLUTION INTRAVENOUS; SUBCUTANEOUS EVERY 8 HOURS
Status: DISCONTINUED | OUTPATIENT
Start: 2021-06-12 | End: 2021-06-14 | Stop reason: HOSPADM

## 2021-06-11 RX ORDER — METOPROLOL TARTRATE 1 MG/ML
1-2 INJECTION, SOLUTION INTRAVENOUS EVERY 5 MIN PRN
Status: DISCONTINUED | OUTPATIENT
Start: 2021-06-11 | End: 2021-06-11 | Stop reason: HOSPADM

## 2021-06-11 RX ORDER — ONDANSETRON 2 MG/ML
INJECTION INTRAMUSCULAR; INTRAVENOUS PRN
Status: DISCONTINUED | OUTPATIENT
Start: 2021-06-11 | End: 2021-06-11

## 2021-06-11 RX ORDER — BISACODYL 10 MG
10 SUPPOSITORY, RECTAL RECTAL DAILY PRN
Status: DISCONTINUED | OUTPATIENT
Start: 2021-06-11 | End: 2021-06-14 | Stop reason: HOSPADM

## 2021-06-11 RX ORDER — BUPIVACAINE HYDROCHLORIDE 2.5 MG/ML
INJECTION, SOLUTION INFILTRATION; PERINEURAL PRN
Status: DISCONTINUED | OUTPATIENT
Start: 2021-06-11 | End: 2021-06-11 | Stop reason: HOSPADM

## 2021-06-11 RX ORDER — SODIUM CHLORIDE, SODIUM LACTATE, POTASSIUM CHLORIDE, CALCIUM CHLORIDE 600; 310; 30; 20 MG/100ML; MG/100ML; MG/100ML; MG/100ML
INJECTION, SOLUTION INTRAVENOUS CONTINUOUS
Status: DISCONTINUED | OUTPATIENT
Start: 2021-06-11 | End: 2021-06-13

## 2021-06-11 RX ORDER — SODIUM CHLORIDE, SODIUM LACTATE, POTASSIUM CHLORIDE, CALCIUM CHLORIDE 600; 310; 30; 20 MG/100ML; MG/100ML; MG/100ML; MG/100ML
INJECTION, SOLUTION INTRAVENOUS CONTINUOUS
Status: DISCONTINUED | OUTPATIENT
Start: 2021-06-11 | End: 2021-06-11 | Stop reason: HOSPADM

## 2021-06-11 RX ORDER — LIDOCAINE 40 MG/G
CREAM TOPICAL
Status: DISCONTINUED | OUTPATIENT
Start: 2021-06-11 | End: 2021-06-11 | Stop reason: HOSPADM

## 2021-06-11 RX ORDER — ACETAMINOPHEN 325 MG/1
975 TABLET ORAL EVERY 8 HOURS
Status: COMPLETED | OUTPATIENT
Start: 2021-06-11 | End: 2021-06-14

## 2021-06-11 RX ORDER — NALOXONE HYDROCHLORIDE 0.4 MG/ML
0.2 INJECTION, SOLUTION INTRAMUSCULAR; INTRAVENOUS; SUBCUTANEOUS
Status: ACTIVE | OUTPATIENT
Start: 2021-06-11 | End: 2021-06-12

## 2021-06-11 RX ORDER — ATORVASTATIN CALCIUM 40 MG/1
40 TABLET, FILM COATED ORAL EVERY EVENING
Status: DISCONTINUED | OUTPATIENT
Start: 2021-06-11 | End: 2021-06-14 | Stop reason: HOSPADM

## 2021-06-11 RX ORDER — FENTANYL CITRATE 50 UG/ML
INJECTION, SOLUTION INTRAMUSCULAR; INTRAVENOUS PRN
Status: DISCONTINUED | OUTPATIENT
Start: 2021-06-11 | End: 2021-06-11

## 2021-06-11 RX ORDER — DOCUSATE SODIUM 100 MG/1
100 CAPSULE, LIQUID FILLED ORAL 2 TIMES DAILY
Status: DISCONTINUED | OUTPATIENT
Start: 2021-06-11 | End: 2021-06-14 | Stop reason: HOSPADM

## 2021-06-11 RX ORDER — ONDANSETRON 2 MG/ML
4 INJECTION INTRAMUSCULAR; INTRAVENOUS EVERY 30 MIN PRN
Status: DISCONTINUED | OUTPATIENT
Start: 2021-06-11 | End: 2021-06-11 | Stop reason: HOSPADM

## 2021-06-11 RX ORDER — HYDROMORPHONE HCL IN WATER/PF 6 MG/30 ML
0.4 PATIENT CONTROLLED ANALGESIA SYRINGE INTRAVENOUS
Status: DISCONTINUED | OUTPATIENT
Start: 2021-06-11 | End: 2021-06-13

## 2021-06-11 RX ORDER — HYDROMORPHONE HCL IN WATER/PF 6 MG/30 ML
0.2 PATIENT CONTROLLED ANALGESIA SYRINGE INTRAVENOUS
Status: DISCONTINUED | OUTPATIENT
Start: 2021-06-11 | End: 2021-06-13

## 2021-06-11 RX ORDER — ONDANSETRON 4 MG/1
4 TABLET, ORALLY DISINTEGRATING ORAL EVERY 30 MIN PRN
Status: DISCONTINUED | OUTPATIENT
Start: 2021-06-11 | End: 2021-06-11 | Stop reason: HOSPADM

## 2021-06-11 RX ORDER — PROCHLORPERAZINE MALEATE 5 MG
10 TABLET ORAL EVERY 6 HOURS PRN
Status: DISCONTINUED | OUTPATIENT
Start: 2021-06-11 | End: 2021-06-14 | Stop reason: HOSPADM

## 2021-06-11 RX ORDER — CEFAZOLIN SODIUM 2 G/100ML
2 INJECTION, SOLUTION INTRAVENOUS SEE ADMIN INSTRUCTIONS
Status: DISCONTINUED | OUTPATIENT
Start: 2021-06-11 | End: 2021-06-11 | Stop reason: HOSPADM

## 2021-06-11 RX ORDER — POLYETHYLENE GLYCOL 3350 17 G/17G
17 POWDER, FOR SOLUTION ORAL DAILY
Status: DISCONTINUED | OUTPATIENT
Start: 2021-06-12 | End: 2021-06-14 | Stop reason: HOSPADM

## 2021-06-11 RX ORDER — AMOXICILLIN 250 MG
1 CAPSULE ORAL 2 TIMES DAILY
Status: DISCONTINUED | OUTPATIENT
Start: 2021-06-11 | End: 2021-06-14 | Stop reason: HOSPADM

## 2021-06-11 RX ORDER — NALOXONE HYDROCHLORIDE 0.4 MG/ML
0.4 INJECTION, SOLUTION INTRAMUSCULAR; INTRAVENOUS; SUBCUTANEOUS
Status: ACTIVE | OUTPATIENT
Start: 2021-06-11 | End: 2021-06-12

## 2021-06-11 RX ORDER — HYDRALAZINE HYDROCHLORIDE 20 MG/ML
2.5-5 INJECTION INTRAMUSCULAR; INTRAVENOUS EVERY 10 MIN PRN
Status: DISCONTINUED | OUTPATIENT
Start: 2021-06-11 | End: 2021-06-11 | Stop reason: HOSPADM

## 2021-06-11 RX ORDER — OXYCODONE HYDROCHLORIDE 5 MG/1
5 TABLET ORAL EVERY 4 HOURS PRN
Status: DISCONTINUED | OUTPATIENT
Start: 2021-06-11 | End: 2021-06-14 | Stop reason: HOSPADM

## 2021-06-11 RX ORDER — LIDOCAINE HYDROCHLORIDE 20 MG/ML
INJECTION, SOLUTION INFILTRATION; PERINEURAL PRN
Status: DISCONTINUED | OUTPATIENT
Start: 2021-06-11 | End: 2021-06-11

## 2021-06-11 RX ORDER — ACETAMINOPHEN 325 MG/1
650 TABLET ORAL EVERY 4 HOURS PRN
Status: DISCONTINUED | OUTPATIENT
Start: 2021-06-14 | End: 2021-06-14 | Stop reason: HOSPADM

## 2021-06-11 RX ORDER — ONDANSETRON 2 MG/ML
4 INJECTION INTRAMUSCULAR; INTRAVENOUS EVERY 6 HOURS PRN
Status: DISCONTINUED | OUTPATIENT
Start: 2021-06-11 | End: 2021-06-14 | Stop reason: HOSPADM

## 2021-06-11 RX ORDER — HYDROMORPHONE HYDROCHLORIDE 1 MG/ML
.3-.5 INJECTION, SOLUTION INTRAMUSCULAR; INTRAVENOUS; SUBCUTANEOUS EVERY 5 MIN PRN
Status: DISCONTINUED | OUTPATIENT
Start: 2021-06-11 | End: 2021-06-11 | Stop reason: HOSPADM

## 2021-06-11 RX ORDER — ONDANSETRON 4 MG/1
4 TABLET, ORALLY DISINTEGRATING ORAL EVERY 6 HOURS PRN
Status: DISCONTINUED | OUTPATIENT
Start: 2021-06-11 | End: 2021-06-14 | Stop reason: HOSPADM

## 2021-06-11 RX ORDER — DEXMEDETOMIDINE HYDROCHLORIDE 100 UG/ML
INJECTION, SOLUTION INTRAVENOUS PRN
Status: DISCONTINUED | OUTPATIENT
Start: 2021-06-11 | End: 2021-06-11 | Stop reason: HOSPADM

## 2021-06-11 RX ORDER — CEFAZOLIN SODIUM 2 G/100ML
2 INJECTION, SOLUTION INTRAVENOUS
Status: COMPLETED | OUTPATIENT
Start: 2021-06-11 | End: 2021-06-11

## 2021-06-11 RX ORDER — OXYCODONE HYDROCHLORIDE 10 MG/1
10 TABLET ORAL EVERY 4 HOURS PRN
Status: DISCONTINUED | OUTPATIENT
Start: 2021-06-11 | End: 2021-06-14 | Stop reason: HOSPADM

## 2021-06-11 RX ORDER — DEXAMETHASONE SODIUM PHOSPHATE 4 MG/ML
INJECTION, SOLUTION INTRA-ARTICULAR; INTRALESIONAL; INTRAMUSCULAR; INTRAVENOUS; SOFT TISSUE PRN
Status: DISCONTINUED | OUTPATIENT
Start: 2021-06-11 | End: 2021-06-11

## 2021-06-11 RX ORDER — FENTANYL CITRATE 50 UG/ML
25-50 INJECTION, SOLUTION INTRAMUSCULAR; INTRAVENOUS
Status: DISCONTINUED | OUTPATIENT
Start: 2021-06-11 | End: 2021-06-11 | Stop reason: HOSPADM

## 2021-06-11 RX ORDER — LIDOCAINE 40 MG/G
CREAM TOPICAL
Status: DISCONTINUED | OUTPATIENT
Start: 2021-06-11 | End: 2021-06-14 | Stop reason: HOSPADM

## 2021-06-11 RX ADMIN — FENTANYL CITRATE 100 MCG: 50 INJECTION, SOLUTION INTRAMUSCULAR; INTRAVENOUS at 07:47

## 2021-06-11 RX ADMIN — OXYCODONE HYDROCHLORIDE 5 MG: 5 TABLET ORAL at 15:43

## 2021-06-11 RX ADMIN — ONDANSETRON 4 MG: 2 INJECTION INTRAMUSCULAR; INTRAVENOUS at 07:29

## 2021-06-11 RX ADMIN — DOCUSATE SODIUM 100 MG: 100 CAPSULE, LIQUID FILLED ORAL at 19:41

## 2021-06-11 RX ADMIN — HYDROMORPHONE HYDROCHLORIDE 0.2 MG: 0.2 INJECTION, SOLUTION INTRAMUSCULAR; INTRAVENOUS; SUBCUTANEOUS at 17:06

## 2021-06-11 RX ADMIN — FENTANYL CITRATE 100 MCG: 50 INJECTION, SOLUTION INTRAMUSCULAR; INTRAVENOUS at 08:22

## 2021-06-11 RX ADMIN — SODIUM CHLORIDE, POTASSIUM CHLORIDE, SODIUM LACTATE AND CALCIUM CHLORIDE: 600; 310; 30; 20 INJECTION, SOLUTION INTRAVENOUS at 07:20

## 2021-06-11 RX ADMIN — PROPOFOL 90 MG: 10 INJECTION, EMULSION INTRAVENOUS at 07:48

## 2021-06-11 RX ADMIN — FENTANYL CITRATE 50 MCG: 50 INJECTION, SOLUTION INTRAMUSCULAR; INTRAVENOUS at 12:08

## 2021-06-11 RX ADMIN — Medication 2 G: at 08:20

## 2021-06-11 RX ADMIN — ACETAMINOPHEN 975 MG: 325 TABLET, FILM COATED ORAL at 23:42

## 2021-06-11 RX ADMIN — PHENYLEPHRINE HYDROCHLORIDE 100 MCG: 10 INJECTION INTRAVENOUS at 09:14

## 2021-06-11 RX ADMIN — MIDAZOLAM 2 MG: 1 INJECTION INTRAMUSCULAR; INTRAVENOUS at 07:29

## 2021-06-11 RX ADMIN — DOCUSATE SODIUM 50 MG AND SENNOSIDES 8.6 MG 1 TABLET: 8.6; 5 TABLET, FILM COATED ORAL at 19:41

## 2021-06-11 RX ADMIN — ROCURONIUM BROMIDE 20 MG: 10 INJECTION INTRAVENOUS at 09:09

## 2021-06-11 RX ADMIN — ROCURONIUM BROMIDE 50 MG: 10 INJECTION INTRAVENOUS at 07:51

## 2021-06-11 RX ADMIN — ACETAMINOPHEN 975 MG: 325 TABLET, FILM COATED ORAL at 15:43

## 2021-06-11 RX ADMIN — SUGAMMADEX 200 MG: 100 INJECTION, SOLUTION INTRAVENOUS at 12:00

## 2021-06-11 RX ADMIN — FENTANYL CITRATE 25 MCG: 50 INJECTION, SOLUTION INTRAMUSCULAR; INTRAVENOUS at 12:55

## 2021-06-11 RX ADMIN — SODIUM CHLORIDE, POTASSIUM CHLORIDE, SODIUM LACTATE AND CALCIUM CHLORIDE: 600; 310; 30; 20 INJECTION, SOLUTION INTRAVENOUS at 15:45

## 2021-06-11 RX ADMIN — PHENYLEPHRINE HYDROCHLORIDE 100 MCG: 10 INJECTION INTRAVENOUS at 08:42

## 2021-06-11 RX ADMIN — ROCURONIUM BROMIDE 20 MG: 10 INJECTION INTRAVENOUS at 09:50

## 2021-06-11 RX ADMIN — LIDOCAINE HYDROCHLORIDE 100 MG: 20 INJECTION, SOLUTION INFILTRATION; PERINEURAL at 07:47

## 2021-06-11 RX ADMIN — HYDROMORPHONE HYDROCHLORIDE 0.5 MG: 1 INJECTION, SOLUTION INTRAMUSCULAR; INTRAVENOUS; SUBCUTANEOUS at 12:34

## 2021-06-11 RX ADMIN — DEXAMETHASONE SODIUM PHOSPHATE 6 MG: 4 INJECTION, SOLUTION INTRA-ARTICULAR; INTRALESIONAL; INTRAMUSCULAR; INTRAVENOUS; SOFT TISSUE at 08:30

## 2021-06-11 ASSESSMENT — MIFFLIN-ST. JEOR: SCORE: 1587.5

## 2021-06-11 ASSESSMENT — ACTIVITIES OF DAILY LIVING (ADL)
ADLS_ACUITY_SCORE: 15
ADLS_ACUITY_SCORE: 15

## 2021-06-11 NOTE — ADDENDUM NOTE
Addendum  created 06/11/21 1424 by Huey Fernández APRN CRNA    Intraprocedure LDAs edited, LDA properties accepted

## 2021-06-11 NOTE — ANESTHESIA POSTPROCEDURE EVALUATION
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    Anesthesia Type:  General    Note:  Disposition: Admission   Postop Pain Control: Uneventful            Sign Out: Well controlled pain   PONV: No   Neuro/Psych: Uneventful            Sign Out: Acceptable/Baseline neuro status   Airway/Respiratory: Uneventful            Sign Out: Acceptable/Baseline resp. status   CV/Hemodynamics: Uneventful            Sign Out: Acceptable CV status; No obvious hypovolemia; No obvious fluid overload   Other NRE: NONE   DID A NON-ROUTINE EVENT OCCUR? No           Last vitals:  Vitals:    06/11/21 1300 06/11/21 1315 06/11/21 1330   BP: 117/86 128/88 122/88   Pulse: 67 69 69   Resp:      Temp:      SpO2: 100% 97% 100%       Last vitals prior to Anesthesia Care Transfer:  CRNA VITALS  6/11/2021 1139 - 6/11/2021 1239      6/11/2021             NIBP:  122/76    Pulse:  64    ART BP:  114/64    Temp:  36.4  C (97.6  F)    SpO2:  100 %    Resp Rate (observed):  12    EKG:  Sinus rhythm          Electronically Signed By: Ry Saha MD  June 11, 2021  1:48 PM

## 2021-06-11 NOTE — OR NURSING
Page sent to Dr. Rincon after Chest Xray completed. Per MD, no issues on chest Xray, ok to place chest tube to water seal and patient can proceed to floor when ready.

## 2021-06-11 NOTE — BRIEF OP NOTE
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  Complications: None.  Implants: * No implants in log *  Plan:   CXR in PACU with chest tube (AIDAN) to suction, regular diet, ambulate, heparin dvt ppx, labs in AM    Rei Rinocn MD  Surgery Resident PGY3

## 2021-06-11 NOTE — PLAN OF CARE
"Neuro: A&O x4, no deficits noted.   Respiratory: Sats mid-high 90s on 1L NC, denies SOB. Wears O2 \"on and off\" at home.   Cardiac: WDL  GI/: due to void, refuses to attempt. LBM yesterday PTA.   Diet: clears  Pain/Nausea: pain managed w/ oxycodone and dilaudid, & scheduled tylenol. Denies nausea.   Incisions/Drains: lap sites x3 DANIELE. Chest tube to WS.   IV Access: L PIV infusing LR @ 50. R PIV SL.    Labs: reviewed.   Activity: repositions self in bed.   Plan: Continue to monitor, follow POC.     "

## 2021-06-11 NOTE — ANESTHESIA PROCEDURE NOTES
Airway       Patient location during procedure: OR       Procedure Start/Stop Times: 6/11/2021 7:47 AM  Staff -        CRNA: Huey Fernández APRN CRNA       Performed By: CRNA  Consent for Airway        Urgency: elective  Indications and Patient Condition       Indications for airway management: connie-procedural       Induction type:intravenous       Mask difficulty assessment: 2 - vent by mask + OA or adjuvant +/- NMBA    Final Airway Details       Final airway type: endotracheal airway       Successful airway: ETT - double lumen left  Endotracheal Airway Details        Cuffed: yes       Successful intubation technique: direct laryngoscopy       DL Blade Type: Camarog 2       Grade View of Cords: 1       Adjucts: stylet       Position: Right       Measured from: gums/teeth (confirmed position by FOB)       Secured at (cm): 28       ETT Double lumen (fr): 39    Post intubation assessment        Placement verified by: capnometry, equal breath sounds and chest rise        Number of attempts at approach: 1       Secured with: cloth tape       Ease of procedure: easy       Dentition: Intact    Medication(s) Administered   Medication Administration Time: 6/11/2021 7:50 AM

## 2021-06-11 NOTE — ANESTHESIA CARE TRANSFER NOTE
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    Anesthesia Type:   General     Note:    Oropharynx: spontaneously breathing  Level of Consciousness: awake  Oxygen Supplementation: face mask  Level of Supplemental Oxygen (L/min / FiO2): 8  Independent Airway: airway patency satisfactory and stable  Dentition: dentition unchanged  Vital Signs Stable: post-procedure vital signs reviewed and stable  Report to RN Given: handoff report given  Patient transferred to: PACU    Handoff Report: Identifed the Patient, Identified the Reponsible Provider, Reviewed the pertinent medical history, Discussed the surgical course, Reviewed Intra-OP anesthesia mangement and issues during anesthesia, Set expectations for post-procedure period and Allowed opportunity for questions and acknowledgement of understanding      Vitals: (Last set prior to Anesthesia Care Transfer)  CRNA VITALS  6/11/2021 1139 - 6/11/2021 1221      6/11/2021             NIBP:  122/76    Pulse:  64    ART BP:  114/64    Temp:  36.4  C (97.6  F)    SpO2:  100 %    Resp Rate (observed):  12    EKG:  Sinus rhythm        Electronically Signed By: NAVI Montaño CRNA  June 11, 2021  12:21 PM

## 2021-06-11 NOTE — ANESTHESIA PROCEDURE NOTES
Arterial Line Procedure Note  Pre-Procedure   Staff -        Anesthesiologist:  Ry Saha MD       Performed By: anesthesiologist       Location: OR       Procedure Start/Stop Times: 6/11/2021 7:52 AM and 6/11/2021 7:56 AM       Pre-Anesthestic Checklist: patient identified, IV checked, risks and benefits discussed, informed consent, monitors and equipment checked, pre-op evaluation and at physician/surgeon's request  Timeout:       Correct Patient: Yes        Correct Procedure: Yes        Correct Site: Yes        Correct Position: Yes   Procedure   Procedure: arterial line       Laterality: right       Insertion Site: radial.  Sterile Prep        Standard elements of sterile barrier followed       Skin prep: Chloraprep  Insertion/Injection        Technique: Seldinger Technique and Eliceo's test completed        Catheter Type/Size: 20 G, 1.75 in/4.5 cm quick cath (integral wire)  Narrative        Tegaderm dressing used.       Complications: None apparent,        Arterial waveform: Yes        IBP within 10% of NIBP: Yes

## 2021-06-11 NOTE — PROGRESS NOTES
Admitted/transferred from: PACU  2 RN full   skin assessment completed by Carie Sevilla, RN and Susannah CHÁVEZ RN.  Skin assessment finding: skin intact except lap sites x3 to right chest/shoulder, R chest tube, recent scar on R knee.   Interventions/actions: educated on weight shifting in bed, early ambulation promoted.      Will continue to monitor.

## 2021-06-11 NOTE — PHARMACY-ADMISSION MEDICATION HISTORY
Admission Medication History Completed by Pharmacy    See [facility] Admission Navigator for allergy information, preferred outpatient pharmacy, prior to admission medications and immunization status.     Medication History Sources:     Patient    CareEverywhere    Dispense record via Startapp    Changes made to PTA medication list (reason):    Added: None    Deleted: calcitriol, Lokelma, lisinopril, prednisone (all have been discontinued by his physicians)    Changed: updated directions for vitamin D    Additional Information:    None    Prior to Admission medications    Medication Sig Last Dose Taking? Auth Provider   atorvastatin (LIPITOR) 40 MG tablet Take 40 mg by mouth every evening  6/10/2021 at PM Yes Reported, Patient   ergocalciferol (ERGOCALCIFEROL) 1.25 MG (19117 UT) capsule Take 50,000 Units by mouth three times a week On Monday Wednesday and Friday 6/9/2021 Yes Reported, Patient        Date completed: 06/11/21    Medication history completed by: iCndy Gaines, La NenaD, BCPS  6/11/2021 3:49 PM

## 2021-06-11 NOTE — PROGRESS NOTES
Saw Mr. Doe post-op. Doing well. Pain well controlled with oral medications. Hungry. Practiced using IS.     Plan:   ADAT to regular diet  Keep chest tube (AIDAN drain) to water seal  Heparin dvt ppx  IS 10x/hr    Rei Rincon MD  Surgery Resident PGY3

## 2021-06-12 ENCOUNTER — APPOINTMENT (OUTPATIENT)
Dept: GENERAL RADIOLOGY | Facility: CLINIC | Age: 66
DRG: 164 | End: 2021-06-12
Attending: STUDENT IN AN ORGANIZED HEALTH CARE EDUCATION/TRAINING PROGRAM
Payer: COMMERCIAL

## 2021-06-12 ENCOUNTER — APPOINTMENT (OUTPATIENT)
Dept: OCCUPATIONAL THERAPY | Facility: CLINIC | Age: 66
DRG: 164 | End: 2021-06-12
Attending: STUDENT IN AN ORGANIZED HEALTH CARE EDUCATION/TRAINING PROGRAM
Payer: COMMERCIAL

## 2021-06-12 LAB
ANION GAP SERPL CALCULATED.3IONS-SCNC: 5 MMOL/L (ref 3–14)
BUN SERPL-MCNC: 33 MG/DL (ref 7–30)
CALCIUM SERPL-MCNC: 10 MG/DL (ref 8.5–10.1)
CHLORIDE SERPL-SCNC: 100 MMOL/L (ref 94–109)
CO2 SERPL-SCNC: 29 MMOL/L (ref 20–32)
CREAT SERPL-MCNC: 2.42 MG/DL (ref 0.66–1.25)
ERYTHROCYTE [DISTWIDTH] IN BLOOD BY AUTOMATED COUNT: 12.9 % (ref 10–15)
GFR SERPL CREATININE-BSD FRML MDRD: 27 ML/MIN/{1.73_M2}
GLUCOSE SERPL-MCNC: 119 MG/DL (ref 70–99)
HCT VFR BLD AUTO: 31.3 % (ref 40–53)
HGB BLD-MCNC: 9.7 G/DL (ref 13.3–17.7)
MCH RBC QN AUTO: 28.9 PG (ref 26.5–33)
MCHC RBC AUTO-ENTMCNC: 31 G/DL (ref 31.5–36.5)
MCV RBC AUTO: 93 FL (ref 78–100)
PLATELET # BLD AUTO: 274 10E9/L (ref 150–450)
POTASSIUM SERPL-SCNC: 5.1 MMOL/L (ref 3.4–5.3)
RBC # BLD AUTO: 3.36 10E12/L (ref 4.4–5.9)
SODIUM SERPL-SCNC: 133 MMOL/L (ref 133–144)
WBC # BLD AUTO: 17.3 10E9/L (ref 4–11)

## 2021-06-12 PROCEDURE — 250N000013 HC RX MED GY IP 250 OP 250 PS 637: Performed by: STUDENT IN AN ORGANIZED HEALTH CARE EDUCATION/TRAINING PROGRAM

## 2021-06-12 PROCEDURE — 250N000011 HC RX IP 250 OP 636: Performed by: STUDENT IN AN ORGANIZED HEALTH CARE EDUCATION/TRAINING PROGRAM

## 2021-06-12 PROCEDURE — 97110 THERAPEUTIC EXERCISES: CPT | Mod: GO

## 2021-06-12 PROCEDURE — 71045 X-RAY EXAM CHEST 1 VIEW: CPT | Mod: 26 | Performed by: RADIOLOGY

## 2021-06-12 PROCEDURE — 36415 COLL VENOUS BLD VENIPUNCTURE: CPT | Performed by: STUDENT IN AN ORGANIZED HEALTH CARE EDUCATION/TRAINING PROGRAM

## 2021-06-12 PROCEDURE — 97535 SELF CARE MNGMENT TRAINING: CPT | Mod: GO

## 2021-06-12 PROCEDURE — 999N000147 HC STATISTIC PT IP EVAL DEFER

## 2021-06-12 PROCEDURE — 71045 X-RAY EXAM CHEST 1 VIEW: CPT

## 2021-06-12 PROCEDURE — 85027 COMPLETE CBC AUTOMATED: CPT | Performed by: STUDENT IN AN ORGANIZED HEALTH CARE EDUCATION/TRAINING PROGRAM

## 2021-06-12 PROCEDURE — 120N000002 HC R&B MED SURG/OB UMMC

## 2021-06-12 PROCEDURE — 97530 THERAPEUTIC ACTIVITIES: CPT | Mod: GO

## 2021-06-12 PROCEDURE — 80048 BASIC METABOLIC PNL TOTAL CA: CPT | Performed by: STUDENT IN AN ORGANIZED HEALTH CARE EDUCATION/TRAINING PROGRAM

## 2021-06-12 PROCEDURE — 97165 OT EVAL LOW COMPLEX 30 MIN: CPT | Mod: GO

## 2021-06-12 RX ADMIN — DOCUSATE SODIUM 50 MG AND SENNOSIDES 8.6 MG 1 TABLET: 8.6; 5 TABLET, FILM COATED ORAL at 19:44

## 2021-06-12 RX ADMIN — HEPARIN SODIUM 5000 UNITS: 5000 INJECTION, SOLUTION INTRAVENOUS; SUBCUTANEOUS at 06:29

## 2021-06-12 RX ADMIN — HEPARIN SODIUM 5000 UNITS: 5000 INJECTION, SOLUTION INTRAVENOUS; SUBCUTANEOUS at 14:05

## 2021-06-12 RX ADMIN — DOCUSATE SODIUM 100 MG: 100 CAPSULE, LIQUID FILLED ORAL at 19:44

## 2021-06-12 RX ADMIN — ACETAMINOPHEN 975 MG: 325 TABLET, FILM COATED ORAL at 09:23

## 2021-06-12 RX ADMIN — OXYCODONE HYDROCHLORIDE 10 MG: 10 TABLET ORAL at 14:05

## 2021-06-12 RX ADMIN — ACETAMINOPHEN 975 MG: 325 TABLET, FILM COATED ORAL at 17:22

## 2021-06-12 RX ADMIN — OXYCODONE HYDROCHLORIDE 10 MG: 10 TABLET ORAL at 04:03

## 2021-06-12 RX ADMIN — DOCUSATE SODIUM 50 MG AND SENNOSIDES 8.6 MG 1 TABLET: 8.6; 5 TABLET, FILM COATED ORAL at 09:23

## 2021-06-12 RX ADMIN — HEPARIN SODIUM 5000 UNITS: 5000 INJECTION, SOLUTION INTRAVENOUS; SUBCUTANEOUS at 22:26

## 2021-06-12 ASSESSMENT — ACTIVITIES OF DAILY LIVING (ADL)
ADLS_ACUITY_SCORE: 15
PREVIOUS_RESPONSIBILITIES: MEAL PREP;HOUSEKEEPING;LAUNDRY;SHOPPING;MEDICATION MANAGEMENT;FINANCES;DRIVING
ADLS_ACUITY_SCORE: 15

## 2021-06-12 NOTE — PROGRESS NOTES
"Thoracic Surgery Progress Note    S: No acute events overnight. Slept well, pain overall well controlled. Working on IS.     O:  /88 (BP Location: Right arm)   Pulse 76   Temp 97.1  F (36.2  C) (Oral)   Resp 16   Ht 1.727 m (5' 8\")   Wt 81.8 kg (180 lb 5.4 oz)   SpO2 92%   BMI 27.42 kg/m      Adult male, resting in bed, alert, interactive, NAD  Respirations non-labored on 1 L NC  Chest tube with serous output  Abdomen soft, non-tender  Extremities warm and well perfused     ml / 200 cc    ml / -    Labs:   K 5.1 (4.6)  Cr 2.42 (3.19)  WBC 17.3, Hgb 9.7, Plt 274    Imaging:   CXR this morning with trace right apical PTX    A/P:   66 yo man with history of right RCC s/p right nephrectomy, also with right diaphragmatic paralysis s/p right diaphragm plication on 06/11. Recovering appropriately.     - Regular diet  - PO and IV pain medication, work to wean off IV narcotics today  - Pulmonary toilet with IS and acapella  - Chest tube to water seal   - Heparin dvt ppx  - Ambulate    Rei Rincon MD  Surgery Resident PGY3            "

## 2021-06-12 NOTE — PLAN OF CARE
PT 7B: Defer: Orders received and appreciated, per thorough chart review and discussion with OT, PT to sign off and complete orders. No Acute PT needs identified, OT able to meet all current therapy needs.

## 2021-06-12 NOTE — PLAN OF CARE
"BP (!) 141/89 (BP Location: Right arm)   Pulse 72   Temp 96.1  F (35.6  C) (Oral)   Resp 16   Ht 1.727 m (5' 8\")   Wt 81.8 kg (180 lb 5.4 oz)   SpO2 94%   BMI 27.42 kg/m      Neuro: A&O x4, no deficits noted.   Respiratory: Sats mid-high 90's on RA. Denies SOB, IS at bedside encouraged.   Cardiac: HTN w/in parameters, denies cardiac chest pain.   GI/: Voiding spontaneously, no BM. +flatus  Diet: regular diet, tolerating well.   Pain/Nausea: pain managed w/ oxycodone x1. Denies nausea.    Incisions/Drains: Lap sites x3 DANIELE pepe. R chest tube WS  IV Access: R PIV infusing.   Labs: reviewed.   Activity: SBA, ambulated halls this shift x3.   Plan: Continue POC.     "

## 2021-06-12 NOTE — OP NOTE
Procedure Date: 06/11/2021    PREOPERATIVE DIAGNOSIS:  Right hemidiaphragm paralysis.    POSTOPERATIVE DIAGNOSIS:  Right hemidiaphragm paralysis.    PROCEDURE PERFORMED:  Robotic thoracoscopic right diaphragm plication.    OPERATING SURGEON:  Chyna Deleon MD     ASSISTANT:  Lb Burleson, registered PA.  Mr. Burleson assisted with this procedure due to lack of other adequately trained help.      ASSISTING RESIDENT:  Rei Rincon MD    OPERATIVE FINDINGS:  Elevated right hemidiaphragm.    DESCRIPTION OF PROCEDURE:  After obtaining informed consent, the patient was brought to the operating room and laid supine on the operating table.  After induction of general anesthesia and introduction of a double-lumen endotracheal tube, the patient was positioned in the left lateral decubitus with the right side up.  The right chest was prepped and draped in sterile manner.  We then proceeded with making the initial thoracoscopic port in the sixth intercostal space just anterior to the scapula.  With thoracoscopic visualization, we then placed ports anteriorly and posteriorly for the robot arms and then 1 inferior assistant port.  The robot was then docked.  We then examined the diaphragm; that seemed lax and not adherent to abdominal structures.  We then proceeded with a plication.  Nonabsorbable V-Loc suture was used for initial plication and to pull the diaphragm together.  We started centrally and proceeded laterally first and then medially.  After the V-Loc suture had taken the laxity off, the plication was reinforced with several pledgeted #2 Ti-Cron sutures to hold the plication together.  The insufflation in the chest was then released, and the plication was examined for tautness.  After this, the intercostal spaces were injected with bupivacaine.  A 19-Swedish Hussein drain was left in the pleural cavity.  The lung was reinflated, and the port sites were closed in layers.  The patient tolerated the procedure  adriana.    Chyna Deleon MD        D: 2021   T: 2021   MT: KAREN    Name:     NISHANT SERRATOJose Elias  MRN:      [id number]        Account:        [id number]   :      1955           Procedure Date: 2021     Document: [id number]

## 2021-06-12 NOTE — PLAN OF CARE
"Vital signs:  Temp: 97  F (36.1  C) Temp src: Oral BP: 136/84 Pulse: 74   Resp: 18 SpO2: 97 % O2 Device: Nasal cannula Oxygen Delivery: 1 LPM Height: 172.7 cm (5' 8\") Weight: 81.8 kg (180 lb 5.4 oz)    Activity: SBA. Ambulated to bathroom.   Neuros: WDL, intermittently anxious.   Cardiac: WDL, VSS  Respiratory: WDL, on 1L O2 (baseline per pt). Denies SOB.   GI/: Passing flatus, no BM. Voiding in adequate amounts.   Diet: Regular, tolerating well.   Lines: PIV x2 infusing LR at 50 mL/hr and SL  Incisions/Drains: R transverse chest lap sites x3 with liquid bandage. R anterior CT with moderate serosang output.  Pain/nausea: 10 mg oxycodone given x1, IV dilaudid available.   Plan: Continue POC  "

## 2021-06-12 NOTE — PROGRESS NOTES
06/12/21 0722   Quick Adds   Type of Visit Initial Occupational Therapy Evaluation   Living Environment   People in home alone   Current Living Arrangements house  ([facility])   Home Accessibility stairs within home   Number of Stairs, Within Home, Primary   (13)   Transportation Anticipated car, drives self;family or friend will provide   Living Environment Comments son stays with pt occasionally however son just bought place down the road from pt. tub shower with chair.    Self-Care   Usual Activity Tolerance moderate   Current Activity Tolerance fair   Regular Exercise No   Equipment Currently Used at Home shower chair;cane, straight;walker, standard   Activity/Exercise/Self-Care Comment was not using cane or walker but has at home.    Disability/Function   Hearing Difficulty or Deaf no   Wear Glasses or Blind yes   Vision Management glasses   Concentrating, Remembering or Making Decisions Difficulty no   Difficulty Communicating no   Difficulty Eating/Swallowing no   Walking or Climbing Stairs Difficulty no   Dressing/Bathing Difficulty no   Toileting issues no   Doing Errands Independently Difficulty (such as shopping) no   Fall history within last six months no   Change in Functional Status Since Onset of Current Illness/Injury yes   General Information   Onset of Illness/Injury or Date of Surgery 06/12/21   Referring Physician Pancho   Patient/Family Therapy Goal Statement (OT) return home    Additional Occupational Profile Info/Pertinent History of Current Problem Robotic thoracoscopic right diaphragm plication.   Existing Precautions/Restrictions thoracotomy   Cognitive Status Examination   Orientation Status orientation to person, place and time   Affect/Mental Status (Cognitive) WNL   Follows Commands WNL   Visual Perception   Visual Impairment/Limitations WNL   Sensory   Sensory Quick Adds No deficits were identified   Pain Assessment   Patient Currently in Pain No   Integumentary/Edema    Integumentary/Edema no deficits were identifed   Posture   Posture not impaired   Range of Motion Comprehensive   General Range of Motion no range of motion deficits identified   Strength Comprehensive (MMT)   General Manual Muscle Testing (MMT) Assessment no strength deficits identified   Muscle Tone Assessment   Muscle Tone Quick Adds No deficits were identified   Coordination   Upper Extremity Coordination No deficits were identified   Instrumental Activities of Daily Living (IADL)   Previous Responsibilities meal prep;housekeeping;laundry;shopping;medication management;finances;driving   IADL Comments IND at baseline.    Clinical Impression   Criteria for Skilled Therapeutic Interventions Met (OT) yes   OT Diagnosis dec IND with ADL/IADLs and transfers   OT Problem List-Impairments impacting ADL activity tolerance impaired;post-surgical precautions;pain;strength   Assessment of Occupational Performance 3-5 Performance Deficits   Identified Performance Deficits LE dressing, amb, stairs, bating   Planned Therapy Interventions (OT) ADL retraining;IADL retraining;ROM;transfer training;home program guidelines   Clinical Decision Making Complexity (OT) low complexity   Therapy Frequency (OT) 6x/week   Predicted Duration of Therapy 7 days   Risk & Benefits of therapy have been explained care plan/treatment goals reviewed;evaluation/treatment results reviewed;current/potential barriers reviewed;risks/benefits reviewed;patient   OT Discharge Planning    OT Discharge Recommendation (DC Rec) Home with assist;home with home care occupational therapy   OT Rationale for DC Rec anticipate pt will be safe to d/c to home once pain controlled and continued participation with therapy. May benefit from HHOT to cont to progress ax tolerance for ADL/iADL tasks.    OT Brief overview of current status  SBA    Total Evaluation Time (Minutes)   Total Evaluation Time (Minutes) 4

## 2021-06-13 ENCOUNTER — APPOINTMENT (OUTPATIENT)
Dept: OCCUPATIONAL THERAPY | Facility: CLINIC | Age: 66
DRG: 164 | End: 2021-06-13
Attending: STUDENT IN AN ORGANIZED HEALTH CARE EDUCATION/TRAINING PROGRAM
Payer: COMMERCIAL

## 2021-06-13 ENCOUNTER — APPOINTMENT (OUTPATIENT)
Dept: GENERAL RADIOLOGY | Facility: CLINIC | Age: 66
DRG: 164 | End: 2021-06-13
Attending: STUDENT IN AN ORGANIZED HEALTH CARE EDUCATION/TRAINING PROGRAM
Payer: COMMERCIAL

## 2021-06-13 LAB
ANION GAP SERPL CALCULATED.3IONS-SCNC: 3 MMOL/L (ref 3–14)
BUN SERPL-MCNC: 36 MG/DL (ref 7–30)
CALCIUM SERPL-MCNC: 9.6 MG/DL (ref 8.5–10.1)
CHLORIDE SERPL-SCNC: 100 MMOL/L (ref 94–109)
CO2 SERPL-SCNC: 32 MMOL/L (ref 20–32)
CREAT SERPL-MCNC: 2.35 MG/DL (ref 0.66–1.25)
GFR SERPL CREATININE-BSD FRML MDRD: 28 ML/MIN/{1.73_M2}
GLUCOSE SERPL-MCNC: 82 MG/DL (ref 70–99)
POTASSIUM SERPL-SCNC: 4.7 MMOL/L (ref 3.4–5.3)
SODIUM SERPL-SCNC: 135 MMOL/L (ref 133–144)

## 2021-06-13 PROCEDURE — 250N000013 HC RX MED GY IP 250 OP 250 PS 637: Performed by: STUDENT IN AN ORGANIZED HEALTH CARE EDUCATION/TRAINING PROGRAM

## 2021-06-13 PROCEDURE — 71045 X-RAY EXAM CHEST 1 VIEW: CPT | Mod: 26 | Performed by: RADIOLOGY

## 2021-06-13 PROCEDURE — 97110 THERAPEUTIC EXERCISES: CPT | Mod: GO

## 2021-06-13 PROCEDURE — 71045 X-RAY EXAM CHEST 1 VIEW: CPT | Mod: 76

## 2021-06-13 PROCEDURE — 36415 COLL VENOUS BLD VENIPUNCTURE: CPT | Performed by: STUDENT IN AN ORGANIZED HEALTH CARE EDUCATION/TRAINING PROGRAM

## 2021-06-13 PROCEDURE — 97530 THERAPEUTIC ACTIVITIES: CPT | Mod: GO

## 2021-06-13 PROCEDURE — 250N000011 HC RX IP 250 OP 636: Performed by: STUDENT IN AN ORGANIZED HEALTH CARE EDUCATION/TRAINING PROGRAM

## 2021-06-13 PROCEDURE — 80048 BASIC METABOLIC PNL TOTAL CA: CPT | Performed by: STUDENT IN AN ORGANIZED HEALTH CARE EDUCATION/TRAINING PROGRAM

## 2021-06-13 PROCEDURE — 71045 X-RAY EXAM CHEST 1 VIEW: CPT

## 2021-06-13 PROCEDURE — 120N000002 HC R&B MED SURG/OB UMMC

## 2021-06-13 RX ADMIN — ACETAMINOPHEN 975 MG: 325 TABLET, FILM COATED ORAL at 18:00

## 2021-06-13 RX ADMIN — HEPARIN SODIUM 5000 UNITS: 5000 INJECTION, SOLUTION INTRAVENOUS; SUBCUTANEOUS at 15:17

## 2021-06-13 RX ADMIN — DOCUSATE SODIUM 50 MG AND SENNOSIDES 8.6 MG 1 TABLET: 8.6; 5 TABLET, FILM COATED ORAL at 19:57

## 2021-06-13 RX ADMIN — DOCUSATE SODIUM 100 MG: 100 CAPSULE, LIQUID FILLED ORAL at 19:57

## 2021-06-13 RX ADMIN — ACETAMINOPHEN 975 MG: 325 TABLET, FILM COATED ORAL at 09:43

## 2021-06-13 RX ADMIN — DOCUSATE SODIUM 50 MG AND SENNOSIDES 8.6 MG 1 TABLET: 8.6; 5 TABLET, FILM COATED ORAL at 08:12

## 2021-06-13 RX ADMIN — HEPARIN SODIUM 5000 UNITS: 5000 INJECTION, SOLUTION INTRAVENOUS; SUBCUTANEOUS at 06:10

## 2021-06-13 RX ADMIN — POLYETHYLENE GLYCOL 3350 17 G: 17 POWDER, FOR SOLUTION ORAL at 08:12

## 2021-06-13 RX ADMIN — HEPARIN SODIUM 5000 UNITS: 5000 INJECTION, SOLUTION INTRAVENOUS; SUBCUTANEOUS at 22:21

## 2021-06-13 RX ADMIN — ACETAMINOPHEN 975 MG: 325 TABLET, FILM COATED ORAL at 00:35

## 2021-06-13 RX ADMIN — DOCUSATE SODIUM 100 MG: 100 CAPSULE, LIQUID FILLED ORAL at 08:12

## 2021-06-13 ASSESSMENT — ACTIVITIES OF DAILY LIVING (ADL)
ADLS_ACUITY_SCORE: 15

## 2021-06-13 NOTE — PROGRESS NOTES
"Thoracic Surgery Progress Note    S: No acute events overnight. Tolerating diet, ambulating, voiding independently     O:  BP (!) 155/86 (BP Location: Right arm)   Pulse 69   Temp 96.2  F (35.7  C) (Oral)   Resp 18   Ht 1.727 m (5' 8\")   Wt 81.8 kg (180 lb 5.4 oz)   SpO2 96%   BMI 27.42 kg/m      Adult male, sitting up in bed, NAD  Respirations non-labored on 1 L NC  Chest tube with serous output  Abdomen soft, non-tender  Extremities warm and well perfused     ml / 200 cc    ml / -    Labs:   K 4.7, Cr 2.35    Imaging:   CXR not yet obtained today     A/P:   66 yo man with history of right RCC s/p right nephrectomy, also with right diaphragmatic paralysis s/p right diaphragm plication on 06/11. Recovering appropriately. Remove chest tube and possibly discharge today.    - Regular diet  - PO pain medications  - Pulmonary toilet with IS and acapella  - Chest tube to be removed today   - Heparin dvt ppx  - Ambulate  - Possible discharge today     Rei Rincon MD  Surgery Resident PGY3            "

## 2021-06-13 NOTE — PLAN OF CARE
Occupational Therapy Discharge Summary    Reason for therapy discharge:    All goals and outcomes met, no further needs identified.    Progress towards therapy goal(s). See goals on Care Plan in [facility] electronic health record for goal details.  Goals met    Therapy recommendation(s):    No further therapy is recommended.  Continue home exercise program.

## 2021-06-13 NOTE — PLAN OF CARE
"Vital signs:  Temp: 96.9  F (36.1  C) Temp src: Oral BP: (!) 130/90 Pulse: 76   Resp: 16 SpO2: 97 % O2 Device: None (Room air) Oxygen Delivery: 1 LPM Height: 172.7 cm (5' 8\") Weight: 81.8 kg (180 lb 5.4 oz)    Activity: SBA. Ambulated to bathroom.   Neuros: WDL, A&O x4, calls appropriately  Cardiac: WDL, VSS  Respiratory: WDL, on 1L O2 (baseline per pt). Denies SOB.   GI/: Passing flatus, no BM. Voiding in adequate amounts.   Diet: Regular, tolerating well.   Lines: PIV x2 infusing LR at 10 mL/hr and SL  Incisions/Drains: R transverse chest lap sites x3 with liquid bandage. R anterior CT to waterseal with moderate serosang output.  Pain/nausea: Scheduled tylenol, oxycodone available.  Plan: Continue POC              "

## 2021-06-13 NOTE — PLAN OF CARE
"/84 (BP Location: Right arm)   Pulse 77   Temp 96  F (35.6  C) (Oral)   Resp 18   Ht 1.727 m (5' 8\")   Wt 81.8 kg (180 lb 5.4 oz)   SpO2 96%   BMI 27.42 kg/m      Neuro: A&O x4, calls appropriately.   Respiratory: Sats mid-90's on RA, denies SOB. IS encouraged.   Cardiac: WDL, denies cardiac chest pain.   GI/: Voids spontaneously. No BM, passing flatus, +BS.   Diet: regular diet.   Pain/Nausea: pain managed w/ schedule tylenol. Denies nausea.   Incisions/Drains: Lap sites x3 DANIELE. Chest tube removed, site CDI.   IV Access: No iv access, refuses PIV placement.    Labs: reviewed.   Activity: up ad marko.    Plan: Possible discharge tomorrow.     "

## 2021-06-14 VITALS
SYSTOLIC BLOOD PRESSURE: 126 MMHG | HEART RATE: 71 BPM | HEIGHT: 68 IN | RESPIRATION RATE: 16 BRPM | WEIGHT: 180.34 LBS | OXYGEN SATURATION: 97 % | TEMPERATURE: 97.8 F | DIASTOLIC BLOOD PRESSURE: 90 MMHG | BODY MASS INDEX: 27.33 KG/M2

## 2021-06-14 LAB
ANION GAP SERPL CALCULATED.3IONS-SCNC: 2 MMOL/L (ref 3–14)
BUN SERPL-MCNC: 32 MG/DL (ref 7–30)
CALCIUM SERPL-MCNC: 9.5 MG/DL (ref 8.5–10.1)
CHLORIDE SERPL-SCNC: 100 MMOL/L (ref 94–109)
CO2 SERPL-SCNC: 33 MMOL/L (ref 20–32)
CREAT SERPL-MCNC: 2.07 MG/DL (ref 0.66–1.25)
GFR SERPL CREATININE-BSD FRML MDRD: 33 ML/MIN/{1.73_M2}
GLUCOSE SERPL-MCNC: 89 MG/DL (ref 70–99)
PLATELET # BLD AUTO: 277 10E9/L (ref 150–450)
POTASSIUM SERPL-SCNC: 4.9 MMOL/L (ref 3.4–5.3)
SODIUM SERPL-SCNC: 136 MMOL/L (ref 133–144)

## 2021-06-14 PROCEDURE — 80048 BASIC METABOLIC PNL TOTAL CA: CPT | Performed by: STUDENT IN AN ORGANIZED HEALTH CARE EDUCATION/TRAINING PROGRAM

## 2021-06-14 PROCEDURE — 36415 COLL VENOUS BLD VENIPUNCTURE: CPT | Performed by: STUDENT IN AN ORGANIZED HEALTH CARE EDUCATION/TRAINING PROGRAM

## 2021-06-14 PROCEDURE — 85049 AUTOMATED PLATELET COUNT: CPT | Performed by: STUDENT IN AN ORGANIZED HEALTH CARE EDUCATION/TRAINING PROGRAM

## 2021-06-14 PROCEDURE — 250N000011 HC RX IP 250 OP 636: Performed by: STUDENT IN AN ORGANIZED HEALTH CARE EDUCATION/TRAINING PROGRAM

## 2021-06-14 PROCEDURE — 250N000013 HC RX MED GY IP 250 OP 250 PS 637: Performed by: STUDENT IN AN ORGANIZED HEALTH CARE EDUCATION/TRAINING PROGRAM

## 2021-06-14 RX ORDER — ACETAMINOPHEN 325 MG/1
650 TABLET ORAL EVERY 4 HOURS PRN
COMMUNITY
Start: 2021-06-14

## 2021-06-14 RX ORDER — OXYCODONE HYDROCHLORIDE 5 MG/1
5-10 TABLET ORAL EVERY 4 HOURS PRN
Qty: 40 TABLET | Refills: 0 | Status: SHIPPED | OUTPATIENT
Start: 2021-06-14

## 2021-06-14 RX ORDER — AMOXICILLIN 250 MG
1 CAPSULE ORAL 2 TIMES DAILY
Qty: 50 TABLET | Refills: 0 | Status: SHIPPED | OUTPATIENT
Start: 2021-06-14

## 2021-06-14 RX ADMIN — ACETAMINOPHEN 975 MG: 325 TABLET, FILM COATED ORAL at 09:25

## 2021-06-14 RX ADMIN — ACETAMINOPHEN 975 MG: 325 TABLET, FILM COATED ORAL at 00:59

## 2021-06-14 RX ADMIN — DOCUSATE SODIUM 100 MG: 100 CAPSULE, LIQUID FILLED ORAL at 07:43

## 2021-06-14 RX ADMIN — HEPARIN SODIUM 5000 UNITS: 5000 INJECTION, SOLUTION INTRAVENOUS; SUBCUTANEOUS at 06:40

## 2021-06-14 RX ADMIN — DOCUSATE SODIUM 50 MG AND SENNOSIDES 8.6 MG 1 TABLET: 8.6; 5 TABLET, FILM COATED ORAL at 07:43

## 2021-06-14 RX ADMIN — POLYETHYLENE GLYCOL 3350 17 G: 17 POWDER, FOR SOLUTION ORAL at 07:44

## 2021-06-14 ASSESSMENT — ACTIVITIES OF DAILY LIVING (ADL)
ADLS_ACUITY_SCORE: 15

## 2021-06-14 NOTE — DISCHARGE SUMMARY
NAME: Bret Doe   MRN: [id number]   : 1955     DATE OF ADMISSION: 2021     PRE/POSTOPERATIVE DIAGNOSES: Right hemidiaphragm paralysis.    PROCEDURES PERFORMED:   Robotic thoracoscopic right diaphragm plication.    PATHOLOGY RESULTS: None    CULTURE RESULTS: None     INTRAOPERATIVE COMPLICATIONS: None     POSTOPERATIVE COMPLICATIONS: None     DRAINS/TUBES PRESENT AT DISCHARGE: None    DATE OF DISCHARGE:  2021     HOSPITAL COURSE: Bret Doe is a 65 year old male who on 2021 underwent the above-named procedures.  He tolerated the operation well and postoperatively was transferred to the general post-surgical unit.  The remainder of his course was essentially uncomplicated.  Prior to discharge, his pain was controlled well, he was able to perform ADLs and ambulate independently without difficulty, and had full return of bowel and bladder function.  On 2021, he was discharged to home in stable condition.    DISCHARGE EXAM:   A&O, NAD  Resp non-labored  Distal extremities warm    Incisions healing well     DISCHARGE INSTRUCTIONS:  Discharge Procedure Orders   X-ray Chest 2 vws*   Standing Status: Future Standing Exp. Date: 21     Order Specific Question Answer Comments   Priority Routine      Reason for your hospital stay   Order Comments: surgery     Adult [facility] Follow-up and recommended labs and tests   Order Comments: 1.) Follow up with Paola Stewart in 1-2 weeks.  2.) Follow up with thoracic surgery Clinical Nurse Specialist Mary Jane Wright, in Thoracic Surgery clinic in 1 month, prior to which PFTs (performed in the upright, and supine positions) and a CXR  should be performed.      Appointments on [place] and/or [facility] (with [facility] or [facility] provider or service). Call [phone] if you haven't heard regarding these appointments within 7 days of discharge.     Discharge Instructions   Order Comments: THORACIC SURGERY DISCHARGE  "INSTRUCTIONS    DIET: Regular diet - as prior to admission     If your plans upon discharge include prolonged periods of sitting (i.e a lengthy car or plane ride), it is highly beneficial to get up and walk at least once per hour to help prevent swelling and blood clots.     You may remove chest tube dressing 48 hours after tube removal and bandage the site at your own discretion thereafter.  Small amounts of leakage are normal for 2-3 days after removal.  Feel free to call with questions.    You may get incision wet 2 days after operation. Do not submerge, soak, or scrub incision or swim until seen in follow-up.    Take incentive spirometer home for continued frequent use    Activity as tolerated, no strenous activity until seen in follow-up, no lifting greater than 20 pounds for the next 8 weeks.    Stay hydrated. Take over the counter fiber (metamucil or benefiber) and stool softeners (Miralax, docusate or senna) if becoming constipated.     Call for fever greater than 101.5, chills, increased size of incision, red skin around incision, vision changes, muscle strength changes, sensation changes, shortness of breath, or other concerns.    No driving while taking narcotic pain medication.    Transition to ibuprofen or tylenol/acetaminophen for pain control. Do not take tylenol/acetaminophen and acetaminophen containing narcotic (e.g., percocet or vicodin) at the same time. If you have known ulcer problems, or kidney trouble (elevated creatinine) do not take the ibuprofen.    In emergencies, call 911    For other Questions or Concerns;   A.) During weekday working hours (Monday through Friday 8am to 4:30pm)   call [phone] and ask to speak to a clinical nurse specialist.     B.) At nights (after 4:30pm), on weekends, or if urgent call [phone] and   tell the  \"I would like to page job code [phone], the thoracic surgery   fellow on call, please.\"     General PFT Lab (Please always keep checked) "   Standing Status: Future Standing Exp. Date: 06/14/22     Pulmonary Function Test   Standing Status: Future Standing Exp. Date: 08/14/21   Order Comments: ** Bronchodilators/neb treatments should be held 4-6 hours prior to receiving a bronchodilator study.    [place] PFT Lab's will distribute Patient Information Packet;     [facility] ([facility]) will contact patient by telephone    Please call the following departments if there are questions or need assistance:  [facility]: [phone]  [facility]: [phone]  [facility]: [phone]  [facility] Registered: [phone]  [facility]: [phone]  [facility]: [phone]           Order Specific Question Answer Comments   Procedure performed at Clinic performed    Reason for procedure s/p diaphragm plication        DISCHARGE MEDICATIONS:   Current Discharge Medication List      START taking these medications    Details   acetaminophen (TYLENOL) 325 MG tablet Take 2 tablets (650 mg) by mouth every 4 hours as needed for mild pain  Qty:      Associated Diagnoses: Paralysis of diaphragm      oxyCODONE (ROXICODONE) 5 MG tablet Take 1-2 tablets (5-10 mg) by mouth every 4 hours as needed for moderate to severe pain Wean narcotic use as tolerated starting at time of discharge  Qty: 40 tablet, Refills: 0    Associated Diagnoses: Paralysis of diaphragm      senna-docusate (SENOKOT-S/PERICOLACE) 8.6-50 MG tablet Take 1 tablet by mouth 2 times daily  Qty: 50 tablet, Refills: 0    Associated Diagnoses: Paralysis of diaphragm         CONTINUE these medications which have NOT CHANGED    Details   atorvastatin (LIPITOR) 40 MG tablet Take 40 mg by mouth every evening       ergocalciferol (ERGOCALCIFEROL) 1.25 MG (94879 UT) capsule Take 50,000 Units by mouth three times a week On Monday Wednesday and Friday

## 2021-06-14 NOTE — PLAN OF CARE
Patient verbalized understanding of After Visit Summary. Personal belongings packed and sent with transport. Medications picked up at discharge pharmacy.

## 2021-06-14 NOTE — PLAN OF CARE
"Vital signs:  Temp: 97.2  F (36.2  C) Temp src: Oral BP: 108/75 Pulse: 74   Resp: 16 SpO2: 98 % O2 Device: None (Room air) Oxygen Delivery: 1 LPM Height: 172.7 cm (5' 8\") Weight: 81.8 kg (180 lb 5.4 oz)    Activity: SBA. Ambulated to bathroom.   Neuros: WDL, A&O x4, calls appropriately  Cardiac: WDL, VSS  Respiratory: WDL, sating well on RA,  Denies SOB.   GI/: Passing flatus, no BM. Voiding in adequate amounts.   Diet: Regular, tolerating well.   Lines: No IV access  Incisions/Drains: R transverse chest lap sites x3 with liquid bandage. old R CT site dressing CDI  Pain/nausea: Scheduled tylenol, oxycodone available.  Plan: Continue POC, probable discharge today              "

## 2021-06-15 ENCOUNTER — PATIENT OUTREACH (OUTPATIENT)
Dept: CARE COORDINATION | Facility: CLINIC | Age: 66
End: 2021-06-15

## 2021-06-15 DIAGNOSIS — Z71.89 OTHER SPECIFIED COUNSELING: ICD-10-CM

## 2021-06-15 NOTE — PROGRESS NOTES
[facility]: Post-Discharge Note  SITUATION                                                      Admission:    Admission Date: 06/11/21   Reason for Admission: (Thoracic Surgery)  Discharge:   Discharge Date: 06/14/21  Discharge Diagnosis: (Paralysis of diaphragm)  Discharge Plan: (Follow up with Paola Stewart in 1-2 weeks.)    BACKGROUND                                                     Bret Doe is a 65 year old male who on 6/11/2021 underwent the above-named procedures.  He tolerated the operation well and postoperatively was transferred to the general post-surgical unit.  The remainder of his course was essentially uncomplicated.  Prior to discharge, his pain was controlled well, he was able to perform ADLs and ambulate independently without difficulty, and had full return of bowel and bladder function.  On June 14, 2021, he was discharged to home in stable condition.      ASSESSMENT      Discharge Assessment  Patient reports symptoms are: Improved  Does the patient have all of their medications?: Yes  Does patient know what their new medications are for?: Yes  Does patient have a follow-up appointment scheduled?: No  Does patient have any other questions or concerns?: No    Post-op  Did the patient have surgery or a procedure: Yes  Incision: healing  Drainage: No  Bleeding: none  Fever: No  Chills: No  Redness: No  Warmth: No  Swelling: No  Incision site pain: No  Eating & Drinking: eating and drinking without complaints/concerns  PO Intake: regular diet  Bowel Function: normal  Date of last BM: 06/14/21  Urinary Status: voiding without complaint/concerns        PLAN                                                      Outpatient Plan:  Follow up with Paola Stewart in 1-2 weeks.  2.) Follow up with thoracic surgery Clinical Nurse Specialist Mary Jane Wright, in Thoracic Surgery clinic in 1 month, prior to which PFTs (performed in the upright, and supine positions) and a CXR  should be  performed.       Appointments on [place] and/or [facility] (with [facility] or [facility] provider or service). Call [phone] if you haven't heard regarding these appointments within 7 days of discharge.    Future Appointments   Date Time Provider Department Center   7/15/2021  1:15 PM Mary Jane Wright APRN [facility]           Estrellita Rodriguez MA

## 2021-06-22 DIAGNOSIS — J98.6 HEMIDIAPHRAGM PARALYSIS: Primary | ICD-10-CM

## 2021-06-24 ENCOUNTER — TELEPHONE (OUTPATIENT)
Dept: ONCOLOGY | Facility: CLINIC | Age: 66
End: 2021-06-24

## 2021-06-24 ENCOUNTER — NURSE TRIAGE (OUTPATIENT)
Dept: ONCOLOGY | Facility: CLINIC | Age: 66
End: 2021-06-24

## 2021-06-24 DIAGNOSIS — J98.6 HEMIDIAPHRAGM PARALYSIS: Primary | ICD-10-CM

## 2021-06-24 NOTE — TELEPHONE ENCOUNTER
[facility] Triage    Patient needing insurance paperwork filled out. States he gave it to someone a few days ago. Review of chart does not show paper trail.    Please follow up.    Insurance information:  [employer]  Claim # [id number]  Attention: Antoine Culp  Phone: [phone], option #1, ext [phone]

## 2021-06-24 NOTE — TELEPHONE ENCOUNTER
Writer placed outgoing fax to PFT and Covid orderrs per the request of [facility].     Screen shot of fax confirmation below.Orders faxed, per request below.    Jo Ann Frankel CMA ([facility])

## 2021-06-28 ENCOUNTER — TELEPHONE (OUTPATIENT)
Dept: SURGERY | Facility: CLINIC | Age: 66
End: 2021-06-28

## 2021-06-28 NOTE — TELEPHONE ENCOUNTER
Patient left a voicemail for me asking about his short term disability paperwork. I explained to him that we did not receive any paperwork from [employer]. I left a voicemail for Olegario with my call back information and fax number so she can refax the paperwork that is needing to be filled out. Bret is needing this paperwork filled out so he doesn't lose his job as he can not go back to work for 8 weeks. He will call in a few days to see if we have received anything. He appreciated my call and will be in touch.      Insurance information:  [employer]  Claim # [id number]  Attention: Antoine Culp  Phone: [phone], option #1, ext [phone]    Update: forms filled out and faxed 7/2/21, patient is aware. He needs to have a chest x-ray and PFT prior to appointment with Mary Jane Wright. Orders faxed again to [facility]

## 2021-07-15 ENCOUNTER — VIRTUAL VISIT (OUTPATIENT)
Dept: SURGERY | Facility: CLINIC | Age: 66
End: 2021-07-15
Attending: STUDENT IN AN ORGANIZED HEALTH CARE EDUCATION/TRAINING PROGRAM
Payer: COMMERCIAL

## 2021-07-15 DIAGNOSIS — J98.6 PARALYZED HEMIDIAPHRAGM: Primary | ICD-10-CM

## 2021-07-15 PROCEDURE — 999N001193 HC VIDEO/TELEPHONE VISIT; NO CHARGE

## 2021-07-15 PROCEDURE — 99024 POSTOP FOLLOW-UP VISIT: CPT | Mod: TEL | Performed by: CLINICAL NURSE SPECIALIST

## 2021-07-15 RX ORDER — BACILLUS COAGULANS 1B CELL
1 CAPSULE ORAL
COMMUNITY
Start: 2021-07-01

## 2021-07-15 NOTE — LETTER
"    7/15/2021         RE: Bret Doe  [address]  [place] [address]        Dear Colleague,    Thank you for referring your patient, Bret Doe, to the [facility]. Please see a copy of my visit note below.    Bret is a 65 year old who is being evaluated via a billable telephone visit.      What phone number would you like to be contacted at? [phone]  How would you like to obtain your AVS? Auroraharrhea   Vitals - Patient Reported  Weight (Patient Reported): 83.9 kg (185 lb)  Height (Patient Reported): 174 cm (5' 8.5\")  BMI (Based on Pt Reported Ht/Wt): 27.72  Pain Score: No Pain (0)      Keyla Troy MA    THORACIC SURGERY FOLLOW UP VISIT      I conducted a telephone visit with Mr. Bret Doe in follow-up today. The clinical summary follows:     PREOP DIAGNOSIS   Right hemidiaphragm paralysis  PROCEDURE   Robotic thoracoscopic right diaphragm plication  DATE OF PROCEDURE  06/11/2021    COMPLICATIONS  None    INTERVAL STUDIES  None    ETOH no  TOB former smoker    SUBJECTIVE  Bret is doing great. He has noticed a night and day difference in himself now compared to before surgery. He is active again and is going on walks every day. His friends have noticed a change as well. He is very happy that he had the surgery done. He has not had any post-op PFT yet due to scheduling issues. He is scheduled for PFT on July 28. He is hoping to be able to ride his motorcycle soon.      IMPRESSION   65 year-old man with right hemidiaphragm paralysis status post plication.    PLAN  I spent 15 min on the date of the encounter in chart review, patient visit, review of tests, documentation and/or discussion with other providers about the issues documented above. I reviewed the plan as follows:  Will review post-op PFT once they are available.  Follow up with us in one year.  Necessary Tests & Appointments: NA  Pain Control Plan: NA  Anticoagulation Plan: NA  Smoking " Cessation: NA  All questions were answered and the patient and present family were in agreement with the plan.  I appreciate the opportunity to participate in the care of your patient and will keep you updated.  Sincerely,          Again, thank you for allowing me to participate in the care of your patient.        Sincerely,        NAVI Mena CNS

## 2021-07-15 NOTE — PROGRESS NOTES
"Bret is a 65 year old who is being evaluated via a billable telephone visit.      What phone number would you like to be contacted at? [phone]  How would you like to obtain your AVS? MyChart   Vitals - Patient Reported  Weight (Patient Reported): 83.9 kg (185 lb)  Height (Patient Reported): 174 cm (5' 8.5\")  BMI (Based on Pt Reported Ht/Wt): 27.72  Pain Score: No Pain (0)      Keyla Troy MA    THORACIC SURGERY FOLLOW UP VISIT      I conducted a telephone visit with Mr. Bret Doe in follow-up today. The clinical summary follows:     PREOP DIAGNOSIS   Right hemidiaphragm paralysis  PROCEDURE   Robotic thoracoscopic right diaphragm plication  DATE OF PROCEDURE  06/11/2021    COMPLICATIONS  None    INTERVAL STUDIES  None    ETOH no  TOB former smoker    SUBJECTIVE  Bret is doing great. He has noticed a night and day difference in himself now compared to before surgery. He is active again and is going on walks every day. His friends have noticed a change as well. He is very happy that he had the surgery done. He has not had any post-op PFT yet due to scheduling issues. He is scheduled for PFT on July 28. He is hoping to be able to ride his motorcycle soon.      IMPRESSION   65 year-old man with right hemidiaphragm paralysis status post plication.    PLAN  I spent 15 min on the date of the encounter in chart review, patient visit, review of tests, documentation and/or discussion with other providers about the issues documented above. I reviewed the plan as follows:  Will review post-op PFT once they are available.  Follow up with us in one year.  Necessary Tests & Appointments: NA  Pain Control Plan: NA  Anticoagulation Plan: NA  Smoking Cessation: NA  All questions were answered and the patient and present family were in agreement with the plan.  I appreciate the opportunity to participate in the care of your patient and will keep you updated.  Sincerely,      "

## 2021-07-23 DIAGNOSIS — J98.6 PARALYSIS OF DIAPHRAGM: ICD-10-CM

## 2021-07-23 DIAGNOSIS — J98.6 HEMIDIAPHRAGM PARALYSIS: ICD-10-CM

## 2021-07-23 LAB
DLCOUNC-%PRED-PRE: 76 %
DLCOUNC-PRE: 19.57 ML/MIN/MMHG
DLCOUNC-PRED: 25.47 ML/MIN/MMHG
ERV-%PRED-PRE: 50 %
ERV-PRE: 0.47 L
ERV-PRED: 0.95 L
EXPTIME-PRE: 6.69 SEC
FEF2575-%PRED-POST: 61 %
FEF2575-%PRED-PRE: 95 %
FEF2575-POST: 1.64 L/SEC
FEF2575-PRE: 2.56 L/SEC
FEF2575-PRED: 2.68 L/SEC
FEFMAX-%PRED-PRE: 94 %
FEFMAX-PRE: 8.05 L/SEC
FEFMAX-PRED: 8.55 L/SEC
FEV1-%PRED-PRE: 71 %
FEV1-PRE: 2.33 L
FEV1FEV6-PRE: 83 %
FEV1FEV6-PRED: 79 %
FEV1FVC-PRE: 83 %
FEV1FVC-PRED: 77 %
FEV1SVC-PRE: 84 %
FEV1SVC-PRED: 71 %
FIFMAX-PRE: 5.79 L/SEC
FRCPLETH-%PRED-PRE: 62 %
FRCPLETH-PRE: 2.22 L
FRCPLETH-PRED: 3.52 L
FVC-%PRED-PRE: 66 %
FVC-PRE: 2.8 L
FVC-PRED: 4.22 L
IC-%PRED-PRE: 63 %
IC-PRE: 2.31 L
IC-PRED: 3.62 L
RVPLETH-%PRED-PRE: 72 %
RVPLETH-PRE: 1.74 L
RVPLETH-PRED: 2.42 L
TLCPLETH-%PRED-PRE: 67 %
TLCPLETH-PRE: 4.53 L
TLCPLETH-PRED: 6.72 L
VA-%PRED-PRE: 68 %
VA-PRE: 4.15 L
VC-%PRED-PRE: 60 %
VC-PRE: 2.78 L
VC-PRED: 4.57 L

## 2021-07-23 PROCEDURE — 94726 PLETHYSMOGRAPHY LUNG VOLUMES: CPT | Performed by: INTERNAL MEDICINE

## 2021-07-23 PROCEDURE — 94060 EVALUATION OF WHEEZING: CPT | Performed by: INTERNAL MEDICINE

## 2021-07-23 PROCEDURE — 94729 DIFFUSING CAPACITY: CPT | Performed by: INTERNAL MEDICINE

## (undated) DEVICE — DAVINCI XI DRAPE ARM 470015

## (undated) DEVICE — LUBRICANT INST KIT ENDO-LUBE 220-90

## (undated) DEVICE — DAVINCI XI FCP CADIERE 8MM ENDOWRIST 471049

## (undated) DEVICE — CATH TRAY FOLEY SURESTEP 16FR W/URNE MTR STLK LATEX A303316A

## (undated) DEVICE — DAVINCI XI SEAL UNIVERSAL 5-8MM 470361

## (undated) DEVICE — SU VICRYL 0 UR-6 27" J603H

## (undated) DEVICE — DAVINCI XI DRAPE COLUMN 470341

## (undated) DEVICE — SUCTION DRY CHEST DRAIN OASIS 3600-100

## (undated) DEVICE — LINEN TOWEL PACK X6 WHITE 5487

## (undated) DEVICE — ENDO VALVE BX EVIS MAJ-210

## (undated) DEVICE — SUCTION MANIFOLD NEPTUNE 2 SYS 4 PORT 0702-020-000

## (undated) DEVICE — SU VICRYL 2-0 SH 27" J317H

## (undated) DEVICE — WIPES FOLEY CARE SURESTEP PROVON DFC100

## (undated) DEVICE — ANTIFOG SOLUTION W/FOAM PAD 31142527

## (undated) DEVICE — ESU ELEC BLADE 2.75" COATED/INSULATED E1455

## (undated) DEVICE — ESU PENCIL W/ROCKER SWITCH BLADE HOLSTER E2350HDB

## (undated) DEVICE — GLOVE PROTEXIS MICRO 6.0  2D73PM60

## (undated) DEVICE — TIES BANDING T50R

## (undated) DEVICE — DAVINCI XI DRIVER NDL MEGA SUTURECUT 8MM EXT 471309

## (undated) DEVICE — DRAPE SHEET MED 44X70" 9355

## (undated) DEVICE — SU WND CLOSURE VLOC 180 ABS 0 9" GS-21 VLOCL0346

## (undated) DEVICE — SYSTEM CLEARIFY VISUALIZATION 21-345

## (undated) DEVICE — SU SILK 0 TIE 6X30" A306H

## (undated) DEVICE — SU PLEDGET SOFT TFE 13MMX7MMX1.5MM D7044

## (undated) DEVICE — SU TICRON 2 GS-21DA 36" 3146-81

## (undated) DEVICE — SU SILK 0 SH 30" K834H

## (undated) DEVICE — DRAIN JACKSON PRATT ROUND SIL 19FR W/TROCAR LF JP-2232

## (undated) DEVICE — Device

## (undated) DEVICE — BLADE CLIPPER SGL USE 9680

## (undated) DEVICE — PREP CHLORAPREP 26ML TINTED ORANGE  260815

## (undated) DEVICE — DRAPE IOBAN INCISE 23X17" 6650EZ

## (undated) DEVICE — CONNECTOR BLAKE DRAIN SGL BCC1

## (undated) DEVICE — LINEN TOWEL PACK X5 5464

## (undated) DEVICE — DAVINCI XI DRIVER NDL LARGE 8MM EXT 471006

## (undated) DEVICE — ADH SKIN CLOSURE PREMIERPRO EXOFIN 1.0ML 3470

## (undated) DEVICE — SU MONOCRYL 4-0 P-3 18" UND Y494G

## (undated) DEVICE — ENDO VALVE SUCTION BRONCH EVIS MAJ-209

## (undated) DEVICE — SOL WATER IRRIG 1000ML BOTTLE 2F7114

## (undated) DEVICE — ESU GROUND PAD ADULT REM W/15' CORD E7507DB

## (undated) DEVICE — TUBING CONMED AIRSEAL SMOKE EVAC INSUFFLATION ASM-EVAC

## (undated) DEVICE — TUBING SUCTION 10'X3/16" N510

## (undated) RX ORDER — HYDROMORPHONE HYDROCHLORIDE 1 MG/ML
INJECTION, SOLUTION INTRAMUSCULAR; INTRAVENOUS; SUBCUTANEOUS
Status: DISPENSED
Start: 2021-06-11

## (undated) RX ORDER — FENTANYL CITRATE 50 UG/ML
INJECTION, SOLUTION INTRAMUSCULAR; INTRAVENOUS
Status: DISPENSED
Start: 2021-06-11

## (undated) RX ORDER — FENTANYL CITRATE-0.9 % NACL/PF 10 MCG/ML
PLASTIC BAG, INJECTION (ML) INTRAVENOUS
Status: DISPENSED
Start: 2021-06-11

## (undated) RX ORDER — LIDOCAINE HYDROCHLORIDE 20 MG/ML
INJECTION, SOLUTION EPIDURAL; INFILTRATION; INTRACAUDAL; PERINEURAL
Status: DISPENSED
Start: 2021-06-11

## (undated) RX ORDER — BUPIVACAINE HYDROCHLORIDE 2.5 MG/ML
INJECTION, SOLUTION EPIDURAL; INFILTRATION; INTRACAUDAL
Status: DISPENSED
Start: 2021-06-11

## (undated) RX ORDER — CEFAZOLIN SODIUM 2 G/100ML
INJECTION, SOLUTION INTRAVENOUS
Status: DISPENSED
Start: 2021-06-11

## (undated) RX ORDER — EPHEDRINE SULFATE 50 MG/ML
INJECTION, SOLUTION INTRAMUSCULAR; INTRAVENOUS; SUBCUTANEOUS
Status: DISPENSED
Start: 2021-06-11

## (undated) RX ORDER — DEXAMETHASONE SODIUM PHOSPHATE 10 MG/ML
INJECTION, SOLUTION INTRAMUSCULAR; INTRAVENOUS
Status: DISPENSED
Start: 2021-06-11

## (undated) RX ORDER — PROPOFOL 10 MG/ML
INJECTION, EMULSION INTRAVENOUS
Status: DISPENSED
Start: 2021-06-11

## (undated) RX ORDER — ONDANSETRON 2 MG/ML
INJECTION INTRAMUSCULAR; INTRAVENOUS
Status: DISPENSED
Start: 2021-06-11

## (undated) RX ORDER — DEXMEDETOMIDINE HYDROCHLORIDE 100 UG/ML
INJECTION, SOLUTION INTRAVENOUS
Status: DISPENSED
Start: 2021-06-11

## (undated) RX ORDER — DEXAMETHASONE SODIUM PHOSPHATE 4 MG/ML
INJECTION, SOLUTION INTRA-ARTICULAR; INTRALESIONAL; INTRAMUSCULAR; INTRAVENOUS; SOFT TISSUE
Status: DISPENSED
Start: 2021-06-11